# Patient Record
Sex: MALE | URBAN - NONMETROPOLITAN AREA
[De-identification: names, ages, dates, MRNs, and addresses within clinical notes are randomized per-mention and may not be internally consistent; named-entity substitution may affect disease eponyms.]

---

## 2018-11-20 ENCOUNTER — APPOINTMENT (OUTPATIENT)
Dept: GENERAL RADIOLOGY | Age: 38
End: 2018-11-20
Payer: MEDICARE

## 2018-11-20 ENCOUNTER — HOSPITAL ENCOUNTER (EMERGENCY)
Age: 38
Discharge: NURSING, REHAB OR LONG-TERM CARE | End: 2018-11-20
Attending: EMERGENCY MEDICINE
Payer: MEDICARE

## 2018-11-20 VITALS
RESPIRATION RATE: 17 BRPM | OXYGEN SATURATION: 94 % | DIASTOLIC BLOOD PRESSURE: 87 MMHG | HEART RATE: 86 BPM | TEMPERATURE: 97.8 F | SYSTOLIC BLOOD PRESSURE: 123 MMHG

## 2018-11-20 DIAGNOSIS — M86.171 OSTEOMYELITIS OF ANKLE OR FOOT, ACUTE, RIGHT (HCC): Primary | ICD-10-CM

## 2018-11-20 DIAGNOSIS — L97.912 ULCER OF RIGHT LOWER EXTREMITY WITH FAT LAYER EXPOSED (HCC): ICD-10-CM

## 2018-11-20 LAB
ANION GAP SERPL CALCULATED.3IONS-SCNC: 14 MEQ/L (ref 8–16)
BASOPHILS # BLD: 0.3 %
BASOPHILS ABSOLUTE: 0 THOU/MM3 (ref 0–0.1)
BUN BLDV-MCNC: 17 MG/DL (ref 7–22)
CALCIUM SERPL-MCNC: 9.3 MG/DL (ref 8.5–10.5)
CHLORIDE BLD-SCNC: 99 MEQ/L (ref 98–111)
CO2: 24 MEQ/L (ref 23–33)
CREAT SERPL-MCNC: 0.5 MG/DL (ref 0.4–1.2)
EOSINOPHIL # BLD: 2.6 %
EOSINOPHILS ABSOLUTE: 0.2 THOU/MM3 (ref 0–0.4)
ERYTHROCYTE [DISTWIDTH] IN BLOOD BY AUTOMATED COUNT: 17.4 % (ref 11.5–14.5)
ERYTHROCYTE [DISTWIDTH] IN BLOOD BY AUTOMATED COUNT: 54.1 FL (ref 35–45)
GFR SERPL CREATININE-BSD FRML MDRD: > 90 ML/MIN/1.73M2
GLUCOSE BLD-MCNC: 181 MG/DL (ref 70–108)
HCT VFR BLD CALC: 38.9 % (ref 42–52)
HEMOGLOBIN: 11.9 GM/DL (ref 14–18)
IMMATURE GRANS (ABS): 0.03 THOU/MM3 (ref 0–0.07)
IMMATURE GRANULOCYTES: 0.3 %
LYMPHOCYTES # BLD: 12.9 %
LYMPHOCYTES ABSOLUTE: 1.1 THOU/MM3 (ref 1–4.8)
MAGNESIUM: 1.9 MG/DL (ref 1.6–2.4)
MCH RBC QN AUTO: 26.2 PG (ref 26–33)
MCHC RBC AUTO-ENTMCNC: 30.6 GM/DL (ref 32.2–35.5)
MCV RBC AUTO: 85.7 FL (ref 80–94)
MONOCYTES # BLD: 6.6 %
MONOCYTES ABSOLUTE: 0.6 THOU/MM3 (ref 0.4–1.3)
NUCLEATED RED BLOOD CELLS: 0 /100 WBC
OSMOLALITY CALCULATION: 279.9 MOSMOL/KG (ref 275–300)
PLATELET # BLD: 288 THOU/MM3 (ref 130–400)
PMV BLD AUTO: 8.8 FL (ref 9.4–12.4)
POTASSIUM SERPL-SCNC: 4.6 MEQ/L (ref 3.5–5.2)
RBC # BLD: 4.54 MILL/MM3 (ref 4.7–6.1)
SEG NEUTROPHILS: 77.3 %
SEGMENTED NEUTROPHILS ABSOLUTE COUNT: 6.9 THOU/MM3 (ref 1.8–7.7)
SODIUM BLD-SCNC: 137 MEQ/L (ref 135–145)
WBC # BLD: 8.9 THOU/MM3 (ref 4.8–10.8)

## 2018-11-20 PROCEDURE — 36415 COLL VENOUS BLD VENIPUNCTURE: CPT

## 2018-11-20 PROCEDURE — 85025 COMPLETE CBC W/AUTO DIFF WBC: CPT

## 2018-11-20 PROCEDURE — 87186 SC STD MICRODIL/AGAR DIL: CPT

## 2018-11-20 PROCEDURE — 6370000000 HC RX 637 (ALT 250 FOR IP): Performed by: EMERGENCY MEDICINE

## 2018-11-20 PROCEDURE — 80048 BASIC METABOLIC PNL TOTAL CA: CPT

## 2018-11-20 PROCEDURE — 73590 X-RAY EXAM OF LOWER LEG: CPT

## 2018-11-20 PROCEDURE — 87205 SMEAR GRAM STAIN: CPT

## 2018-11-20 PROCEDURE — 87147 CULTURE TYPE IMMUNOLOGIC: CPT

## 2018-11-20 PROCEDURE — 87040 BLOOD CULTURE FOR BACTERIA: CPT

## 2018-11-20 PROCEDURE — 73600 X-RAY EXAM OF ANKLE: CPT

## 2018-11-20 PROCEDURE — 83735 ASSAY OF MAGNESIUM: CPT

## 2018-11-20 PROCEDURE — 99284 EMERGENCY DEPT VISIT MOD MDM: CPT

## 2018-11-20 PROCEDURE — 87070 CULTURE OTHR SPECIMN AEROBIC: CPT

## 2018-11-20 PROCEDURE — 87077 CULTURE AEROBIC IDENTIFY: CPT

## 2018-11-20 PROCEDURE — 2709999900 HC NON-CHARGEABLE SUPPLY

## 2018-11-20 RX ORDER — CLINDAMYCIN HYDROCHLORIDE 150 MG/1
300 CAPSULE ORAL ONCE
Status: COMPLETED | OUTPATIENT
Start: 2018-11-20 | End: 2018-11-20

## 2018-11-20 RX ORDER — GINSENG 100 MG
CAPSULE ORAL
Status: DISCONTINUED
Start: 2018-11-20 | End: 2018-11-20 | Stop reason: HOSPADM

## 2018-11-20 RX ADMIN — CLINDAMYCIN HYDROCHLORIDE 300 MG: 150 CAPSULE ORAL at 16:00

## 2018-11-20 ASSESSMENT — PAIN DESCRIPTION - LOCATION: LOCATION: BUTTOCKS

## 2018-11-20 ASSESSMENT — ENCOUNTER SYMPTOMS
EYE PAIN: 0
ABDOMINAL PAIN: 0
SINUS PRESSURE: 0
BLOOD IN STOOL: 0
EYE REDNESS: 0
PHOTOPHOBIA: 0
NAUSEA: 0
EYE ITCHING: 0
VOICE CHANGE: 0
TROUBLE SWALLOWING: 0
RHINORRHEA: 0
CHEST TIGHTNESS: 0
EYE DISCHARGE: 0
CONSTIPATION: 0
SHORTNESS OF BREATH: 0
COUGH: 0
BACK PAIN: 0
DIARRHEA: 0
WHEEZING: 0
SORE THROAT: 0
ABDOMINAL DISTENTION: 0
CHOKING: 0
VOMITING: 0

## 2018-11-20 ASSESSMENT — PAIN SCALES - GENERAL: PAINLEVEL_OUTOF10: 6

## 2018-11-20 ASSESSMENT — PAIN DESCRIPTION - PAIN TYPE: TYPE: ACUTE PAIN

## 2018-11-20 NOTE — ED PROVIDER NOTES
[facility]  eMERGENCY dEPARTMENT eNCOUnter          CHIEF COMPLAINT       Chief Complaint   Patient presents with    Wound Check       Nurses Notes reviewed and I agreeexcept as noted in the HPI. HISTORY OF PRESENT ILLNESS    Guero Chodwhury is a 45 y.o. male with a past medical history of DM who presents to the ED via EMS for evaluation of a wound check. Patient is currently a resident at [facility] secondary to a decubitus ulcer on the buttocks that requires frequent dressing and cleaning. Patient is awaiting treatment and surgery at [facility] for the ulcer. Patient states that he has developed an ulceration to the right lower leg that has gradually worsened over time. The wound is being cleaned and dressed every other day by nursing home staff. He has not been seen by Dr. Matheus Taylor (ID) at the wound clinic for the RLE ulcer. He was previously on antibiotics but these were stopped 5 days ago. No fevers or chills. Patient has a history of MRSA. No additional complaints or concerns at the time of initial evaluation. The HPI was provided by the patient. REVIEW OF SYSTEMS     Review of Systems   Constitutional: Negative for activity change, appetite change, diaphoresis, fatigue and unexpected weight change. HENT: Negative for congestion, ear discharge, ear pain, hearing loss, rhinorrhea, sinus pressure, sore throat, trouble swallowing and voice change. Eyes: Negative for photophobia, pain, discharge, redness and itching. Respiratory: Negative for cough, choking, chest tightness, shortness of breath and wheezing. Cardiovascular: Negative for chest pain, palpitations and leg swelling. Gastrointestinal: Negative for abdominal distention, abdominal pain, blood in stool, constipation, diarrhea, nausea and vomiting. Endocrine: Negative for polydipsia, polyphagia and polyuria.    Genitourinary: Negative for decreased urine volume, difficulty urinating, dysuria, enuresis, frequency, MAGNESIUM   ANION GAP   GLOMERULAR FILTRATION RATE, ESTIMATED   OSMOLALITY       EMERGENCY DEPARTMENT COURSE:   Vitals:    Vitals:    11/20/18 1410 11/20/18 1620   BP: 125/71 117/65   Pulse: 88 77   Resp: 17 15   Temp: 97.8 °F (36.6 °C)    TempSrc: Oral    SpO2: 94% 95%     2:27 PM: The patient was seen andevaluated. Appropriate labs were ordered and reviewed. Patient is afebrile. He is also nonambulatory. Patient is due to go to [place] upcoming this week for MRI of his lumbar spine, and wound flap repair. Patient has seen Dr. Kristin Park and Dr. Robel Liu in the past.  In review of his labs and imaging. He does not have white blood cell count. He is afebrile. He is normotensive. Vital signs are stable. X-ray of the wound of his lower extremity did not reveal any osteomyelitis. X-ray of his heel however did reveal osteomyelitis of the calcaneus. At this point I called and spoke with podiatry and Dr. Robel Liu discussed case with him. He would graciously see the patient in consult in his office this week coming up. He was instructed to call Dr. Robel Liu and schedule an appointment. I then called his infectious disease doctor, Dr. Kristin Park, and discussed case with him. The patient still has a PICC line placed. We will place the patient back on Zosyn for 14 days. And the patient will follow up with Dr. Kristin Park. I wrote a prescription for the antibiotics. I also wrote a prescription for wound care. Patient is scheduled for his MRI and follow-up with [place] doses proceed as planned. I discussed this at bedside with the patient who understood and agreed with the plan. Patient is supple and discharged home in stable condition        The patient has what appears to be a stage II pressure ulcer of his right lower extremity. He also has venous stasis ulcers of his heel of his foot. There is resulting osteomyelitis of this.   Caregivers are instructed to clean and dressed wound daily and have wound care address

## 2018-11-23 NOTE — PROGRESS NOTES
Pharmacy Note  ED Culture Follow-up    Bhavik Ramos is a 45 y.o. male. Allergies: Sulfa antibiotics     Labs:  Lab Results   Component Value Date    BUN 17 11/20/2018    CREATININE 0.5 11/20/2018    WBC 8.9 11/20/2018     CrCl cannot be calculated (Unknown ideal weight.). Current antimicrobials:   · Zosyn + clindamycin    ASSESSMENT:  Micro results:   Wound culture: positive for MRSA      PLAN:  Need for intervention: Yes, but will defer to provider at nursing home  Discussed with: Jordan Alvarado MD  Chosen treatment:    · Patient will be treated by provider at nursing home    Patient response:   · No need to contact patient    Called/sent in prescription to: Not applicable    Please call with any questions.  Prem Coates, PharmD 4:52 PM 11/23/2018

## 2018-11-24 LAB
AEROBIC CULTURE: ABNORMAL
GRAM STAIN RESULT: ABNORMAL
ORGANISM: ABNORMAL

## 2018-11-26 LAB
BLOOD CULTURE, ROUTINE: NORMAL
BLOOD CULTURE, ROUTINE: NORMAL

## 2018-12-29 DIAGNOSIS — G89.4 CHRONIC PAIN SYNDROME: Primary | ICD-10-CM

## 2018-12-29 PROBLEM — Z78.9 NURSING HOME RESIDENT: Status: ACTIVE | Noted: 2018-12-29

## 2018-12-29 RX ORDER — OXYCODONE HYDROCHLORIDE 5 MG/1
5 TABLET ORAL EVERY 6 HOURS PRN
Qty: 28 TABLET | Refills: 0 | Status: SHIPPED | OUTPATIENT
Start: 2018-12-29 | End: 2019-01-05

## 2019-03-10 DIAGNOSIS — F51.01 PRIMARY INSOMNIA: Primary | ICD-10-CM

## 2019-03-10 RX ORDER — ZOLPIDEM TARTRATE 5 MG/1
5 TABLET ORAL NIGHTLY PRN
Qty: 30 TABLET | Refills: 5 | Status: SHIPPED | OUTPATIENT
Start: 2019-03-10 | End: 2019-03-24

## 2019-03-13 ENCOUNTER — OUTSIDE SERVICES (OUTPATIENT)
Dept: FAMILY MEDICINE CLINIC | Age: 39
End: 2019-03-13

## 2019-03-13 DIAGNOSIS — F51.01 PRIMARY INSOMNIA: ICD-10-CM

## 2019-03-13 DIAGNOSIS — L89.104 PRESSURE INJURY OF BACK, STAGE 4 (HCC): ICD-10-CM

## 2019-03-13 DIAGNOSIS — Z79.4 TYPE 2 DIABETES MELLITUS WITH OTHER SPECIFIED COMPLICATION, WITH LONG-TERM CURRENT USE OF INSULIN (HCC): ICD-10-CM

## 2019-03-13 DIAGNOSIS — E66.01 MORBID OBESITY (HCC): ICD-10-CM

## 2019-03-13 DIAGNOSIS — E11.69 TYPE 2 DIABETES MELLITUS WITH OTHER SPECIFIED COMPLICATION, WITH LONG-TERM CURRENT USE OF INSULIN (HCC): ICD-10-CM

## 2019-03-13 DIAGNOSIS — I10 ESSENTIAL HYPERTENSION: ICD-10-CM

## 2019-03-19 ENCOUNTER — OUTSIDE SERVICES (OUTPATIENT)
Dept: FAMILY MEDICINE CLINIC | Age: 39
End: 2019-03-19
Payer: MEDICARE

## 2019-03-19 DIAGNOSIS — L89.154 DECUBITUS ULCER OF SACRAL REGION, STAGE 4 (HCC): ICD-10-CM

## 2019-03-19 DIAGNOSIS — L89.104 PRESSURE INJURY OF BACK, STAGE 4 (HCC): ICD-10-CM

## 2019-03-19 DIAGNOSIS — F51.01 PRIMARY INSOMNIA: ICD-10-CM

## 2019-03-19 DIAGNOSIS — Z79.4 TYPE 2 DIABETES MELLITUS WITH OTHER SPECIFIED COMPLICATION, WITH LONG-TERM CURRENT USE OF INSULIN (HCC): ICD-10-CM

## 2019-03-19 DIAGNOSIS — E66.01 MORBID OBESITY (HCC): Primary | ICD-10-CM

## 2019-03-19 DIAGNOSIS — E11.69 TYPE 2 DIABETES MELLITUS WITH OTHER SPECIFIED COMPLICATION, WITH LONG-TERM CURRENT USE OF INSULIN (HCC): ICD-10-CM

## 2019-03-19 DIAGNOSIS — I10 ESSENTIAL HYPERTENSION: ICD-10-CM

## 2019-03-19 PROCEDURE — G8484 FLU IMMUNIZE NO ADMIN: HCPCS | Performed by: FAMILY MEDICINE

## 2019-03-19 PROCEDURE — 3046F HEMOGLOBIN A1C LEVEL >9.0%: CPT | Performed by: FAMILY MEDICINE

## 2019-03-19 PROCEDURE — 99308 SBSQ NF CARE LOW MDM 20: CPT | Performed by: FAMILY MEDICINE

## 2019-04-08 PROBLEM — L89.154 DECUBITUS ULCER OF SACRAL REGION, STAGE 4 (HCC): Chronic | Status: ACTIVE | Noted: 2018-11-08

## 2019-04-08 PROBLEM — E66.01 MORBID OBESITY DUE TO EXCESS CALORIES (HCC): Chronic | Status: ACTIVE | Noted: 2018-11-08

## 2019-04-16 NOTE — PROGRESS NOTES
Psychiatric: He has a normal mood and affect. His behavior is normal. Judgment and thought content normal.   Nursing note and vitals reviewed. ASSESSMENT & PLAN  Kayleigh Bueno was seen today for 1 month follow-up. Diagnoses and all orders for this visit:    Other specified diabetes mellitus with other specified complication, without long-term current use of insulin ([address]    Decubitus ulcer of sacral region, stage 4 ([address]    Morbid obesity due to excess calories ([address]    Primary insomnia    Essential hypertension          1. Decubitus Ulcer:  cont wound care, Oxycodone  2. DM2:  cont Levemir, Metformin, SS insulin, Linagliptin  3. HTN:  cont Metoprolol,   4.  Insomnia:  cont Gerri Flatten

## 2019-04-19 ENCOUNTER — OUTSIDE SERVICES (OUTPATIENT)
Dept: FAMILY MEDICINE CLINIC | Age: 39
End: 2019-04-19
Payer: MEDICARE

## 2019-04-19 DIAGNOSIS — E66.01 MORBID OBESITY DUE TO EXCESS CALORIES (HCC): Chronic | ICD-10-CM

## 2019-04-19 DIAGNOSIS — I10 ESSENTIAL HYPERTENSION: Chronic | ICD-10-CM

## 2019-04-19 DIAGNOSIS — E13.69 OTHER SPECIFIED DIABETES MELLITUS WITH OTHER SPECIFIED COMPLICATION, WITHOUT LONG-TERM CURRENT USE OF INSULIN (HCC): Primary | Chronic | ICD-10-CM

## 2019-04-19 DIAGNOSIS — L89.154 DECUBITUS ULCER OF SACRAL REGION, STAGE 4 (HCC): Chronic | ICD-10-CM

## 2019-04-19 DIAGNOSIS — F51.01 PRIMARY INSOMNIA: Chronic | ICD-10-CM

## 2019-04-19 PROCEDURE — 99308 SBSQ NF CARE LOW MDM 20: CPT | Performed by: FAMILY MEDICINE

## 2019-04-19 PROCEDURE — 3046F HEMOGLOBIN A1C LEVEL >9.0%: CPT | Performed by: FAMILY MEDICINE

## 2019-05-29 ENCOUNTER — OUTSIDE SERVICES (OUTPATIENT)
Dept: FAMILY MEDICINE CLINIC | Age: 39
End: 2019-05-29
Payer: MEDICARE

## 2019-05-29 DIAGNOSIS — G89.29 OTHER CHRONIC PAIN: ICD-10-CM

## 2019-05-29 DIAGNOSIS — E13.69 OTHER SPECIFIED DIABETES MELLITUS WITH OTHER SPECIFIED COMPLICATION, WITHOUT LONG-TERM CURRENT USE OF INSULIN (HCC): Chronic | ICD-10-CM

## 2019-05-29 DIAGNOSIS — L89.154 DECUBITUS ULCER OF SACRAL REGION, STAGE 4 (HCC): Chronic | ICD-10-CM

## 2019-05-29 DIAGNOSIS — F51.01 PRIMARY INSOMNIA: Chronic | ICD-10-CM

## 2019-05-29 DIAGNOSIS — I10 ESSENTIAL HYPERTENSION: Primary | Chronic | ICD-10-CM

## 2019-05-29 DIAGNOSIS — E66.01 MORBID OBESITY DUE TO EXCESS CALORIES (HCC): Chronic | ICD-10-CM

## 2019-05-29 PROCEDURE — 99309 SBSQ NF CARE MODERATE MDM 30: CPT | Performed by: NURSE PRACTITIONER

## 2019-05-29 NOTE — PROGRESS NOTES
Polydipsia, Polyphagia, Polyuria    OBJECTIVE:  PHYSICAL EXAM:  VS:  141/79, 97.9, 96, 19, 242 bs, 94%  Weight in pounds:  414.4  Pain: Denies    VS reviewed. General Appearance: morbidly obese, debilitated, in no acute distress  Head: normocephalic and atraumatic  Eyes: conjunctivae and eye lids without erythema  ENT: external ear and ear canal normal bilaterally, nose without deformity, nasal mucosa and turbinates normal without polyps, oropharynx normal, dentition is normal for age, no lip or gum lesions noted  Neck: supple and non-tender without mass, no thyromegaly or thyroid nodules, no cervical lymphadenopathy  Pulmonary/Chest: clear but diminished to auscultation bilaterally- no wheezes, rales or rhonchi, normal air movement, no respiratory distress or retractions, requires no supplemental oxygen  Cardiovascular: normal rate, regular rhythm, normal S1 and S2, no murmurs, rubs, clicks, or gallops, distal pulses intact  Abdomen: soft, non-tender, non-distended, bowel sounds physiologic,  no rebound or guarding, no masses or hernias noted. Liver and spleen without enlargement. Extremities: no cyanosis, clubbing or edema of the lower extremities  Musculoskeletal: No joint swelling or gross deformity   Neuro:  Alert, 4/5 strength globally and symmetrically, normal speech, no focal findings or movement disorder noted  Psych:  Normal affect without evidence of depression or anxiety, insight and judgement are intact, memory appears intact. He is routinely pleasant and communicates well with staff. Staff take good care of him. Skin: warm and dry, no rash or erythema    ASSESSMENT & PLAN:    1. Essential hypertension  BP is routinely a bit high with SBP typically in the 140s to 150s and HR typically in the 80s and 90s. Patient denies headaches, chest pain. Increase dose of metoprolol tartrate to 25 mg po every 12 hours. Continue to monitor.     2. Other specified diabetes mellitus with other specified complication, without long-term current use of insulin (HCC)  Blood glucose levels are routinely in the 200s to 300s. Metformin was increased last month, with no improvement. Continue current dose of Detemir, Metformin, Linagliptin, sliding scale Humalog. Adding Humalog 4 units sq TID with meals to medication regimen. Continue to encourage carb controlled diet as tolerates. 3. Decubitus ulcer of sacral region, stage 4 (HCC)  Current with Dr. Nayana Pastor, ID. Appreciate Wound Nurse's input. Asking for a CBC and BMP on Thursday. 4. Morbid obesity due to excess calories ([address]  Continues to gain weight. Appetite good. Continue to encourage diabetic diet. Family brings patient food from home. He is working with Therapy and SW to obtain a new motorized WC for increased mobility. He reports his current motorized wheelchair does not fit him properly. 5. Other chronic pain  Patient has weaned down from large doses of narcotics fairly well since admission. Continue prn Oxycodone. Will decrease Tylenol from 1 gram po 4 times daily scheduled to 650 mg po 4 times daily scheduled. Patient states his pain is improving as his wound heals. 6. Primary insomnia  Continue home dose of Ambien. Denies uncontrolled insomnia. 7.  Bilateral lower leg edema  Weight is up a few pounds. Will initiate Lasix 20 mg po once daily x 3 days. Monitor electrolytes. Disposition:  Assessment and plan as stated above. Follow-up per routine and as warranted. Plan of care reviewed by Dr. Mattie Talley DO.   Electronically signed by GAYATHRI Palacios NP on 5/29/2019 at 12:13 PM

## 2019-06-27 NOTE — PROGRESS NOTES
Psychiatric: He has a normal mood and affect. His behavior is normal. Judgment and thought content normal.   Nursing note and vitals reviewed. ASSESSMENT & PLAN  Kailash Salvador was seen today for wound infection. Diagnoses and all orders for this visit:    Decubitus ulcer of sacral region, stage 4 ([address]    Morbid obesity due to excess calories ([address]    Primary insomnia    Essential hypertension    Other specified diabetes mellitus with other specified complication, without long-term current use of insulin ([address]          1. Decubitus Ulcer:  cont wound care, Oxycodone  2. DM2:  cont Levemir, Metformin, SS insulin, Linagliptin  3. HTN:  cont Metoprolol,   4.  Insomnia:  cont Nimo Lora

## 2019-06-28 ENCOUNTER — OUTSIDE SERVICES (OUTPATIENT)
Dept: FAMILY MEDICINE CLINIC | Age: 39
End: 2019-06-28
Payer: MEDICARE

## 2019-06-28 DIAGNOSIS — F51.01 PRIMARY INSOMNIA: Chronic | ICD-10-CM

## 2019-06-28 DIAGNOSIS — I10 ESSENTIAL HYPERTENSION: Chronic | ICD-10-CM

## 2019-06-28 DIAGNOSIS — E13.69 OTHER SPECIFIED DIABETES MELLITUS WITH OTHER SPECIFIED COMPLICATION, WITHOUT LONG-TERM CURRENT USE OF INSULIN (HCC): Chronic | ICD-10-CM

## 2019-06-28 DIAGNOSIS — L89.154 DECUBITUS ULCER OF SACRAL REGION, STAGE 4 (HCC): Primary | Chronic | ICD-10-CM

## 2019-06-28 DIAGNOSIS — E66.01 MORBID OBESITY DUE TO EXCESS CALORIES (HCC): Chronic | ICD-10-CM

## 2019-06-28 PROCEDURE — 3046F HEMOGLOBIN A1C LEVEL >9.0%: CPT | Performed by: FAMILY MEDICINE

## 2019-06-28 PROCEDURE — 99308 SBSQ NF CARE LOW MDM 20: CPT | Performed by: FAMILY MEDICINE

## 2019-07-17 ENCOUNTER — OUTSIDE SERVICES (OUTPATIENT)
Dept: FAMILY MEDICINE CLINIC | Age: 39
End: 2019-07-17
Payer: MEDICARE

## 2019-07-17 DIAGNOSIS — R05.9 COUGH: ICD-10-CM

## 2019-07-17 DIAGNOSIS — J02.9 SORE THROAT: ICD-10-CM

## 2019-07-17 DIAGNOSIS — R09.89 CHEST CONGESTION: Primary | ICD-10-CM

## 2019-07-17 PROCEDURE — 99309 SBSQ NF CARE MODERATE MDM 30: CPT | Performed by: NURSE PRACTITIONER

## 2019-07-25 ENCOUNTER — OUTSIDE SERVICES (OUTPATIENT)
Dept: FAMILY MEDICINE CLINIC | Age: 39
End: 2019-07-25
Payer: MEDICARE

## 2019-07-25 DIAGNOSIS — M62.81 GENERALIZED MUSCLE WEAKNESS: ICD-10-CM

## 2019-07-25 DIAGNOSIS — E66.01 MORBID OBESITY DUE TO EXCESS CALORIES (HCC): Chronic | ICD-10-CM

## 2019-07-25 DIAGNOSIS — I10 ESSENTIAL HYPERTENSION: Chronic | ICD-10-CM

## 2019-07-25 DIAGNOSIS — L89.154 DECUBITUS ULCER OF SACRAL REGION, STAGE 4 (HCC): Primary | Chronic | ICD-10-CM

## 2019-07-25 DIAGNOSIS — Z93.3 COLOSTOMY STATUS (HCC): ICD-10-CM

## 2019-07-25 DIAGNOSIS — G89.29 OTHER CHRONIC PAIN: ICD-10-CM

## 2019-07-25 DIAGNOSIS — E13.69 OTHER SPECIFIED DIABETES MELLITUS WITH OTHER SPECIFIED COMPLICATION, WITHOUT LONG-TERM CURRENT USE OF INSULIN (HCC): Chronic | ICD-10-CM

## 2019-07-25 PROCEDURE — 99309 SBSQ NF CARE MODERATE MDM 30: CPT | Performed by: NURSE PRACTITIONER

## 2019-07-25 NOTE — PROGRESS NOTES
and HR typically in the 80s and 90s. Patient denies headaches, chest pain. Increased dose of metoprolol tartrate to 25 mg po every 12 hours last visit. Will increase metoprolol to 50 mg po every 12 hours for better BP and HR control. Continue to monitor. 2. Other specified diabetes mellitus with other specified complication, without long-term current use of insulin (HCC)  Blood glucose levels are routinely in the 200s to 300s. Continue current dose of Metformin, Linagliptin, sliding scale Humalog, scheduled Humalog TID with meals. Will increase dosage of Detemir to 28 units sq daily at HS. Continue to encourage carb controlled diet as tolerates. 3. Decubitus ulcer of sacral region, stage 4 (HCC)  Current with Dr. Blaire Stovall, ID. Appreciate Wound Nurse's input. Asking for a CBC and BMP on Thursday. 4. Morbid obesity due to excess calories ([address]  Continues to gain weight. Appetite good. Continue to encourage diabetic diet. Family brings patient food from home. He is working with Therapy and  to obtain a new motorized WC for increased mobility. He reports his current motorized wheelchair does not fit him properly. 5. Other chronic pain  Patient has weaned down from large doses of narcotics fairly well since admission. Continue prn Oxycodone and scheduled Tylenol. He has tolerated weaning down of Oxycodone well, but I feel he still benefits from this for dressing changes. Patient states his pain is improving as his wound heals. 6. Primary insomnia  Continue home dose of Ambien. Denies uncontrolled insomnia. 7.  Bilateral lower leg edema  Weight is up a few pounds. Will initiate Lasix 20 mg po once for now. BMP on Monday. Disposition:  Assessment and plan as stated above. Follow-up per routine and as warranted. Plan of care reviewed by Dr. David Rubio DO.   Electronically signed by GAYATHRI Dickerson NP on 7/25/2019 at 6:31 PM

## 2019-08-14 ENCOUNTER — OUTSIDE SERVICES (OUTPATIENT)
Dept: FAMILY MEDICINE CLINIC | Age: 39
End: 2019-08-14
Payer: MEDICARE

## 2019-08-14 DIAGNOSIS — E13.69 OTHER SPECIFIED DIABETES MELLITUS WITH OTHER SPECIFIED COMPLICATION, WITHOUT LONG-TERM CURRENT USE OF INSULIN (HCC): Primary | Chronic | ICD-10-CM

## 2019-08-14 DIAGNOSIS — F33.1 MAJOR DEPRESSIVE DISORDER, RECURRENT EPISODE, MODERATE (HCC): ICD-10-CM

## 2019-08-14 PROCEDURE — 99309 SBSQ NF CARE MODERATE MDM 30: CPT | Performed by: NURSE PRACTITIONER

## 2019-08-26 ENCOUNTER — OUTSIDE SERVICES (OUTPATIENT)
Dept: FAMILY MEDICINE CLINIC | Age: 39
End: 2019-08-26
Payer: MEDICARE

## 2019-08-26 DIAGNOSIS — Z93.3 COLOSTOMY STATUS (HCC): ICD-10-CM

## 2019-08-26 DIAGNOSIS — I10 ESSENTIAL HYPERTENSION: Chronic | ICD-10-CM

## 2019-08-26 DIAGNOSIS — F33.1 MAJOR DEPRESSIVE DISORDER, RECURRENT EPISODE, MODERATE (HCC): ICD-10-CM

## 2019-08-26 DIAGNOSIS — L89.154 STAGE IV PRESSURE ULCER OF SACRAL REGION (HCC): Primary | Chronic | ICD-10-CM

## 2019-08-26 DIAGNOSIS — E13.69 OTHER SPECIFIED DIABETES MELLITUS WITH OTHER SPECIFIED COMPLICATION, WITHOUT LONG-TERM CURRENT USE OF INSULIN (HCC): Chronic | ICD-10-CM

## 2019-08-26 DIAGNOSIS — G89.29 OTHER CHRONIC PAIN: ICD-10-CM

## 2019-08-26 DIAGNOSIS — E66.01 MORBID OBESITY (HCC): ICD-10-CM

## 2019-08-26 PROCEDURE — 99309 SBSQ NF CARE MODERATE MDM 30: CPT | Performed by: NURSE PRACTITIONER

## 2019-09-10 ENCOUNTER — OUTSIDE SERVICES (OUTPATIENT)
Dept: PHYSICAL MEDICINE AND REHAB | Age: 39
End: 2019-09-10
Payer: MEDICARE

## 2019-09-10 DIAGNOSIS — R26.9 NEUROLOGIC GAIT DYSFUNCTION: ICD-10-CM

## 2019-09-10 PROCEDURE — 99305 1ST NF CARE MODERATE MDM 35: CPT | Performed by: PHYSICAL MEDICINE & REHABILITATION

## 2019-09-25 ENCOUNTER — OUTSIDE SERVICES (OUTPATIENT)
Dept: FAMILY MEDICINE CLINIC | Age: 39
End: 2019-09-25
Payer: MEDICARE

## 2019-09-25 DIAGNOSIS — Z93.3 COLOSTOMY STATUS (HCC): ICD-10-CM

## 2019-09-25 DIAGNOSIS — I10 ESSENTIAL HYPERTENSION: Chronic | ICD-10-CM

## 2019-09-25 DIAGNOSIS — E13.69 OTHER SPECIFIED DIABETES MELLITUS WITH OTHER SPECIFIED COMPLICATION, WITHOUT LONG-TERM CURRENT USE OF INSULIN (HCC): Chronic | ICD-10-CM

## 2019-09-25 DIAGNOSIS — G89.29 OTHER CHRONIC PAIN: ICD-10-CM

## 2019-09-25 DIAGNOSIS — E66.01 MORBID OBESITY DUE TO EXCESS CALORIES (HCC): Chronic | ICD-10-CM

## 2019-09-25 DIAGNOSIS — M62.81 GENERALIZED MUSCLE WEAKNESS: ICD-10-CM

## 2019-09-25 DIAGNOSIS — L89.154 STAGE IV PRESSURE ULCER OF SACRAL REGION (HCC): Chronic | ICD-10-CM

## 2019-09-25 DIAGNOSIS — F33.1 MAJOR DEPRESSIVE DISORDER, RECURRENT EPISODE, MODERATE (HCC): Primary | ICD-10-CM

## 2019-09-25 DIAGNOSIS — F51.01 PRIMARY INSOMNIA: Chronic | ICD-10-CM

## 2019-09-25 PROCEDURE — 99309 SBSQ NF CARE MODERATE MDM 30: CPT | Performed by: NURSE PRACTITIONER

## 2019-09-27 NOTE — PROGRESS NOTES
[facility] Progress Note    NAME: Reshma Landry  DATE: 19  ROOM #: A 08-2  : 1980  REASON FOR VISIT: Other (routine visit)    CODE STATUS: Full Code    History obtained from chart review, the patient and staff. SUBJECTIVE:  HPI: Cris Kenney is a 44 y.o. male. Patient has a PMH significant for:  Past Medical History:   Diagnosis Date    Diabetes ([address]     HTN (hypertension)     Insomnia     Morbid obesity ([address]     Pressure ulcer        Pt seen and examined at bedside today and is noted to be in no acute distress. Staff provide appropriate updates; appreciate staff input. I have reviewed patients past medical, surgical, social, and family history and have made updates where appropriate. Allergies and Medications were reviewed through the [facility] EMR.     Patient Active Problem List    Diagnosis Date Noted    Major depressive disorder, recurrent episode, moderate ([address] 2019    Colostomy status ([address] 2019    Generalized muscle weakness 2019    Other chronic pain 2019    Morbid obesity ([address]     Diabetes ([address]     HTN (hypertension)     Insomnia     Nursing home resident 2018    Morbid obesity due to excess calories ([address] 2018    Stage IV pressure ulcer of sacral region ([address] 2018       REVIEW OF SYSTEMS  Positive responses are highlighted in bold  Constitutional:  Fever, Chills, Night Sweats, Fatigue, Unexpected changes in weight  HENT:  Ear pain, Tinnitus, Nosebleeds, Trouble swallowing, Hearing loss, Sore throat  Cardiovascular:  Chest Pain, Palpitations, Orthopnea, Paroxysmal Nocturnal Dyspnea  Respiratory:  Cough, Wheezing, Shortness of breath, Chest tightness, Apnea  Gastrointestinal:  Nausea, Vomiting, Diarrhea, Constipation, Heartburn, Blood in stool  Genitourinary:  Difficulty or painful urination, Flank pain, Change in frequency, Urgency  Skin:  Color change, Rash, Itching, Wound  Musculoskeletal:  Joint pain, Back pain, Gait

## 2019-10-09 ENCOUNTER — OUTSIDE SERVICES (OUTPATIENT)
Dept: FAMILY MEDICINE CLINIC | Age: 39
End: 2019-10-09
Payer: MEDICARE

## 2019-10-09 DIAGNOSIS — E13.69 OTHER SPECIFIED DIABETES MELLITUS WITH OTHER SPECIFIED COMPLICATION, WITHOUT LONG-TERM CURRENT USE OF INSULIN (HCC): Chronic | ICD-10-CM

## 2019-10-09 DIAGNOSIS — R60.0 BILATERAL LEG EDEMA: ICD-10-CM

## 2019-10-09 DIAGNOSIS — L03.115 CELLULITIS OF RIGHT LEG: Primary | ICD-10-CM

## 2019-10-09 PROCEDURE — 99309 SBSQ NF CARE MODERATE MDM 30: CPT | Performed by: NURSE PRACTITIONER

## 2019-10-15 ENCOUNTER — OUTSIDE SERVICES (OUTPATIENT)
Dept: FAMILY MEDICINE CLINIC | Age: 39
End: 2019-10-15
Payer: MEDICARE

## 2019-10-15 DIAGNOSIS — R60.0 BILATERAL LEG EDEMA: ICD-10-CM

## 2019-10-15 DIAGNOSIS — L03.116 LEFT LEG CELLULITIS: Primary | ICD-10-CM

## 2019-10-15 PROCEDURE — 99309 SBSQ NF CARE MODERATE MDM 30: CPT | Performed by: NURSE PRACTITIONER

## 2019-10-16 ENCOUNTER — OUTSIDE SERVICES (OUTPATIENT)
Dept: FAMILY MEDICINE CLINIC | Age: 39
End: 2019-10-16
Payer: MEDICARE

## 2019-10-16 DIAGNOSIS — E13.69 OTHER SPECIFIED DIABETES MELLITUS WITH OTHER SPECIFIED COMPLICATION, WITHOUT LONG-TERM CURRENT USE OF INSULIN (HCC): Primary | Chronic | ICD-10-CM

## 2019-10-16 DIAGNOSIS — E66.01 MORBID OBESITY DUE TO EXCESS CALORIES (HCC): Chronic | ICD-10-CM

## 2019-10-16 DIAGNOSIS — R60.0 BILATERAL LEG EDEMA: ICD-10-CM

## 2019-10-16 PROCEDURE — 99497 ADVNCD CARE PLAN 30 MIN: CPT | Performed by: NURSE PRACTITIONER

## 2019-10-23 ENCOUNTER — OUTSIDE SERVICES (OUTPATIENT)
Dept: FAMILY MEDICINE CLINIC | Age: 39
End: 2019-10-23
Payer: MEDICARE

## 2019-10-23 DIAGNOSIS — I10 ESSENTIAL HYPERTENSION: Chronic | ICD-10-CM

## 2019-10-23 DIAGNOSIS — L89.154 STAGE IV PRESSURE ULCER OF SACRAL REGION (HCC): Primary | Chronic | ICD-10-CM

## 2019-10-23 DIAGNOSIS — E66.01 MORBID OBESITY DUE TO EXCESS CALORIES (HCC): Chronic | ICD-10-CM

## 2019-10-23 DIAGNOSIS — E13.69 OTHER SPECIFIED DIABETES MELLITUS WITH OTHER SPECIFIED COMPLICATION, WITHOUT LONG-TERM CURRENT USE OF INSULIN (HCC): Chronic | ICD-10-CM

## 2019-10-23 DIAGNOSIS — F51.01 PRIMARY INSOMNIA: Chronic | ICD-10-CM

## 2019-10-23 PROCEDURE — 99308 SBSQ NF CARE LOW MDM 20: CPT | Performed by: FAMILY MEDICINE

## 2019-10-28 ENCOUNTER — OUTSIDE SERVICES (OUTPATIENT)
Dept: FAMILY MEDICINE CLINIC | Age: 39
End: 2019-10-28
Payer: MEDICARE

## 2019-10-28 DIAGNOSIS — R60.0 BILATERAL LEG EDEMA: Primary | ICD-10-CM

## 2019-10-28 DIAGNOSIS — E13.69 OTHER SPECIFIED DIABETES MELLITUS WITH OTHER SPECIFIED COMPLICATION, WITHOUT LONG-TERM CURRENT USE OF INSULIN (HCC): Chronic | ICD-10-CM

## 2019-10-28 PROCEDURE — 99309 SBSQ NF CARE MODERATE MDM 30: CPT | Performed by: NURSE PRACTITIONER

## 2019-11-11 ENCOUNTER — OUTSIDE SERVICES (OUTPATIENT)
Dept: FAMILY MEDICINE CLINIC | Age: 39
End: 2019-11-11
Payer: MEDICARE

## 2019-11-11 DIAGNOSIS — N50.89 SCROTAL EDEMA: ICD-10-CM

## 2019-11-11 PROCEDURE — 99309 SBSQ NF CARE MODERATE MDM 30: CPT | Performed by: NURSE PRACTITIONER

## 2019-11-12 ENCOUNTER — OUTSIDE SERVICES (OUTPATIENT)
Dept: FAMILY MEDICINE CLINIC | Age: 39
End: 2019-11-12
Payer: MEDICARE

## 2019-11-12 DIAGNOSIS — N50.89 SCROTAL EDEMA: ICD-10-CM

## 2019-11-12 PROCEDURE — 99309 SBSQ NF CARE MODERATE MDM 30: CPT | Performed by: NURSE PRACTITIONER

## 2019-11-13 ENCOUNTER — OUTSIDE SERVICES (OUTPATIENT)
Dept: FAMILY MEDICINE CLINIC | Age: 39
End: 2019-11-13
Payer: MEDICARE

## 2019-11-13 DIAGNOSIS — N50.89 SCROTAL EDEMA: Primary | ICD-10-CM

## 2019-11-13 PROCEDURE — 99308 SBSQ NF CARE LOW MDM 20: CPT | Performed by: NURSE PRACTITIONER

## 2019-11-22 ENCOUNTER — OUTSIDE SERVICES (OUTPATIENT)
Dept: FAMILY MEDICINE CLINIC | Age: 39
End: 2019-11-22
Payer: MEDICARE

## 2019-11-22 DIAGNOSIS — R60.0 BILATERAL LEG EDEMA: Primary | ICD-10-CM

## 2019-11-22 DIAGNOSIS — N50.89 SCROTAL EDEMA: ICD-10-CM

## 2019-11-22 PROCEDURE — 99309 SBSQ NF CARE MODERATE MDM 30: CPT | Performed by: NURSE PRACTITIONER

## 2019-11-27 ENCOUNTER — OUTSIDE SERVICES (OUTPATIENT)
Dept: FAMILY MEDICINE CLINIC | Age: 39
End: 2019-11-27
Payer: MEDICARE

## 2019-11-27 DIAGNOSIS — Z93.3 COLOSTOMY STATUS (HCC): ICD-10-CM

## 2019-11-27 DIAGNOSIS — E13.69 OTHER SPECIFIED DIABETES MELLITUS WITH OTHER SPECIFIED COMPLICATION, WITHOUT LONG-TERM CURRENT USE OF INSULIN (HCC): Chronic | ICD-10-CM

## 2019-11-27 DIAGNOSIS — G89.29 OTHER CHRONIC PAIN: ICD-10-CM

## 2019-11-27 DIAGNOSIS — E66.01 MORBID OBESITY DUE TO EXCESS CALORIES (HCC): Chronic | ICD-10-CM

## 2019-11-27 DIAGNOSIS — I10 ESSENTIAL HYPERTENSION: Primary | Chronic | ICD-10-CM

## 2019-11-27 DIAGNOSIS — M62.81 GENERALIZED MUSCLE WEAKNESS: ICD-10-CM

## 2019-11-27 DIAGNOSIS — N50.89 SCROTAL EDEMA: ICD-10-CM

## 2019-11-27 DIAGNOSIS — R60.0 BILATERAL LEG EDEMA: ICD-10-CM

## 2019-11-27 DIAGNOSIS — F33.1 MAJOR DEPRESSIVE DISORDER, RECURRENT EPISODE, MODERATE (HCC): ICD-10-CM

## 2019-11-27 DIAGNOSIS — L89.154 STAGE IV PRESSURE ULCER OF SACRAL REGION (HCC): Chronic | ICD-10-CM

## 2019-11-27 PROCEDURE — 99309 SBSQ NF CARE MODERATE MDM 30: CPT | Performed by: NURSE PRACTITIONER

## 2019-12-13 ENCOUNTER — OUTSIDE SERVICES (OUTPATIENT)
Dept: FAMILY MEDICINE CLINIC | Age: 39
End: 2019-12-13
Payer: MEDICARE

## 2019-12-13 DIAGNOSIS — M62.81 GENERALIZED MUSCLE WEAKNESS: ICD-10-CM

## 2019-12-13 DIAGNOSIS — I10 ESSENTIAL HYPERTENSION: Chronic | ICD-10-CM

## 2019-12-13 DIAGNOSIS — F33.1 MAJOR DEPRESSIVE DISORDER, RECURRENT EPISODE, MODERATE (HCC): ICD-10-CM

## 2019-12-13 DIAGNOSIS — E66.01 MORBID OBESITY (HCC): Primary | ICD-10-CM

## 2019-12-13 DIAGNOSIS — G89.29 OTHER CHRONIC PAIN: ICD-10-CM

## 2019-12-13 DIAGNOSIS — Z93.3 COLOSTOMY STATUS (HCC): ICD-10-CM

## 2019-12-13 DIAGNOSIS — F51.01 PRIMARY INSOMNIA: Chronic | ICD-10-CM

## 2019-12-13 DIAGNOSIS — L89.154 STAGE IV PRESSURE ULCER OF SACRAL REGION (HCC): Chronic | ICD-10-CM

## 2019-12-13 DIAGNOSIS — E13.69 OTHER SPECIFIED DIABETES MELLITUS WITH OTHER SPECIFIED COMPLICATION, WITHOUT LONG-TERM CURRENT USE OF INSULIN (HCC): Chronic | ICD-10-CM

## 2019-12-13 DIAGNOSIS — R60.0 BILATERAL LEG EDEMA: ICD-10-CM

## 2019-12-13 DIAGNOSIS — N50.89 SCROTAL EDEMA: ICD-10-CM

## 2019-12-13 PROCEDURE — 99309 SBSQ NF CARE MODERATE MDM 30: CPT | Performed by: NURSE PRACTITIONER

## 2019-12-24 ENCOUNTER — OUTSIDE SERVICES (OUTPATIENT)
Dept: FAMILY MEDICINE CLINIC | Age: 39
End: 2019-12-24
Payer: MEDICARE

## 2019-12-24 DIAGNOSIS — N50.89 SCROTAL EDEMA: Primary | ICD-10-CM

## 2019-12-24 PROCEDURE — 99309 SBSQ NF CARE MODERATE MDM 30: CPT | Performed by: NURSE PRACTITIONER

## 2020-01-03 ENCOUNTER — OUTSIDE SERVICES (OUTPATIENT)
Dept: FAMILY MEDICINE CLINIC | Age: 40
End: 2020-01-03
Payer: MEDICARE

## 2020-01-03 PROCEDURE — 99308 SBSQ NF CARE LOW MDM 20: CPT | Performed by: NURSE PRACTITIONER

## 2020-01-13 ENCOUNTER — OUTSIDE SERVICES (OUTPATIENT)
Dept: FAMILY MEDICINE CLINIC | Age: 40
End: 2020-01-13
Payer: MEDICARE

## 2020-01-13 PROBLEM — E11.9 DIABETES MELLITUS (HCC): Status: ACTIVE | Noted: 2020-01-13

## 2020-01-13 PROCEDURE — 99309 SBSQ NF CARE MODERATE MDM 30: CPT | Performed by: NURSE PRACTITIONER

## 2020-01-13 NOTE — PROGRESS NOTES
[facility] & Progress Note    NAME: Katheryn Landry  DATE: 1/3/20  ROOM #: A 08-2  : 1980  REASON FOR VISIT: Other (Acute visit regarding cellulitis and weight gain)    CODE STATUS: Full Code    History obtained from chart review, the patient and staff. SUBJECTIVE:  HPI: Sergey Vivas is a 44 y.o. male. Patient has a PMH significant for:  Past Medical History:   Diagnosis Date    Diabetes ([address]     HTN (hypertension)     Insomnia     Morbid obesity ([address]     Pressure ulcer        Pt seen and examined at bedside today and is noted to be in no acute distress. Staff provide appropriate updates; appreciate staff input. I have reviewed patients past medical, surgical, social, and family history and have made updates where appropriate. Allergies and Medications were reviewed through the [facility] EMR.     Patient Active Problem List    Diagnosis Date Noted    Diabetes mellitus ([address] 2020    Scrotal edema 2019    Bilateral leg edema 10/09/2019    Major depressive disorder, recurrent episode, moderate ([address] 2019    Colostomy status ([address] 2019    Generalized muscle weakness 2019    Other chronic pain 2019    Morbid obesity ([address]     Diabetes ([address]     HTN (hypertension)     Insomnia     Nursing home resident 2018    Morbid obesity due to excess calories ([address] 2018    Stage IV pressure ulcer of sacral region ([address] 2018       REVIEW OF SYSTEMS  Positive responses are highlighted in bold  Constitutional:  Fever, Chills, Night Sweats, Fatigue, Unexpected changes in weight  HENT:  Ear pain, Tinnitus, Nosebleeds, Trouble swallowing, Hearing loss, Sore throat  Cardiovascular:  Chest Pain, Palpitations, Orthopnea, Paroxysmal Nocturnal Dyspnea  Respiratory:  Cough, Occasional Wheezing, Shortness of breath, Chest tightness, Apnea  Gastrointestinal:  Nausea, Vomiting, Diarrhea, Constipation, Heartburn, Blood in stool  Genitourinary:  Difficulty or painful urination, Flank pain, Change in frequency, Urgency; scrotal edema  Skin:  Color change, Rash, Itching, Wound  Musculoskeletal:  Joint pain, Back pain, Gait problems, Joint swelling, Myalgias  Neurological:  Dizziness, Headaches, Presyncope, Numbness, Seizures, Tremors  Endocrine:  Heat Intolerance, Cold Intolerance, Polydipsia, Polyphagia, Polyuria    OBJECTIVE:  PHYSICAL EXAM:  VS:  156/82, 97.7, 100, 18, 180 blood glucose  Weight in pounds:  457.9 (was 457.0, 445.8, 441.7, 411.3, 416.2, 421.6, 414.4)  Pain: Denies    VS reviewed. General Appearance: morbidly obese, debilitated, in no acute distress  Head: normocephalic and atraumatic  Eyes: conjunctivae and eye lids without erythema  ENT: external ear and ear canal normal bilaterally, nose without deformity, nasal mucosa and turbinates normal without polyps, oropharynx normal, dentition is normal for age, no lip or gum lesions noted  Neck: supple and non-tender without mass, no thyromegaly or thyroid nodules, no cervical lymphadenopathy  Pulmonary/Chest: Clear but diminished t/o without rales, wheezing or rhonchi, normal air movement, no respiratory distress or retractions, requires no supplemental oxygen. Denies sob at rest and cough. He acknowledges intermittent wheezing. Cardiovascular: normal rate, regular rhythm, normal S1 and S2, no murmurs, rubs, clicks, or gallops, distal pulses intact  Abdomen: soft, non-tender, non-distended, bowel sounds physiologic,  no rebound or guarding, no masses or hernias noted. Liver and spleen without enlargement. Cellulitis to right side of lower abdomen. Extremities: no cyanosis, clubbing. Chronic, 3+ pitting edema to BLE. Scrotal edema is present. Musculoskeletal: No joint swelling or gross deformity; bed ridden until his custom wheelchair is available. Neuro:  Alert, 4/5 strength to BUE.   BLE with 1/5 strength, normal speech, no focal findings or movement disorder noted  Psych:  Normal affect without evidence of depression or anxiety, insight and judgement are intact, memory appears intact. He is routinely pleasant and communicates well with staff. Staff take good care of him. Skin: warm and dry, no rash or erythema    ASSESSMENT & PLAN:    1. Acute cellulitis of the right abdomen  April KELSY Yi started a course of Doxycycline this weekend and he reports his redness and warmth is down. Continue Doxycycline. He denies fevers and chills. 1.  Acute on chronic scrotal edema  Pt's scrotal edema continues to be a challenge. He continues to elevate scrotum onto a rolled cloth. He denies inability to void, fevers, chills. Continue Bumex 2.5 mg po bid and Bumex 2.0 mg po once daily. Initiating metolazone 5.0 mg po 30 minutes prior to 14:00 dosage of Bumex. BMP tomorrow morning. Continue daily weights. It is noted that he often refuses to be weighed. Continue to monitor. Disposition:  Assessment and plan as stated above. Follow-up per routine and as warranted. Plan of care reviewed by Dr. Jacobo Guadarrama DO.   Electronically signed by GAYATHRI Barrientos NP on 1/13/2020 at 4:08 PM

## 2020-01-14 ENCOUNTER — OUTSIDE SERVICES (OUTPATIENT)
Dept: FAMILY MEDICINE CLINIC | Age: 40
End: 2020-01-14
Payer: MEDICARE

## 2020-01-14 PROBLEM — Z91.199 PERSONAL HISTORY OF NONCOMPLIANCE WITH MEDICAL TREATMENT, PRESENTING HAZARDS TO HEALTH: Status: ACTIVE | Noted: 2020-01-14

## 2020-01-14 PROBLEM — R60.1 ANASARCA: Status: ACTIVE | Noted: 2020-01-14

## 2020-01-14 PROCEDURE — 99309 SBSQ NF CARE MODERATE MDM 30: CPT | Performed by: NURSE PRACTITIONER

## 2020-01-14 NOTE — PROGRESS NOTES
[facility] Progress Note    NAME: Brenda Landry  DATE: 20  ROOM #: A 08-2  : 1980  REASON FOR VISIT: Other (Acute visit for anasarca and scrotal edema)    CODE STATUS: Full Code    History obtained from chart review, the patient and staff. SUBJECTIVE:  HPI: Silverio Moss is a 44 y.o. male. Patient has a PMH significant for:  Past Medical History:   Diagnosis Date    Diabetes ([address]     HTN (hypertension)     Insomnia     Morbid obesity ([address]     Pressure ulcer      Pt seen and examined at bedside today for chronic anasarca and scrotal edema. He is in bed at time of assessment and is noted to be in no acute distress. Staff provide appropriate updates; appreciate staff input. I have reviewed patients past medical, surgical, social, and family history and have made updates where appropriate. Allergies and Medications were reviewed through the [facility] EMR.     Patient Active Problem List    Diagnosis Date Noted    Personal history of noncompliance with medical treatment, presenting hazards to health 2020    Anasarca 2020    Diabetes mellitus ([address] 2020    Scrotal edema 2019    Bilateral leg edema 10/09/2019    Major depressive disorder, recurrent episode, moderate ([address] 2019    Colostomy status ([address] 2019    Generalized muscle weakness 2019    Other chronic pain 2019    Morbid obesity ([address]     Diabetes ([address]     HTN (hypertension)     Insomnia     Nursing home resident 2018    Morbid obesity due to excess calories ([address] 2018    Stage IV pressure ulcer of sacral region ([address] 2018       REVIEW OF SYSTEMS  Positive responses are highlighted in bold  Constitutional:  Fever, Chills, Night Sweats, Fatigue, Unexpected changes in weight  HENT:  Ear pain, Tinnitus, Nosebleeds, Trouble swallowing, Hearing loss, Sore throat  Cardiovascular:  Chest Pain, Palpitations, Orthopnea, Paroxysmal Nocturnal Dyspnea  Respiratory: Cough, Occasional Wheezing, Shortness of breath, Chest tightness, Apnea  Gastrointestinal:  Nausea, Vomiting, Diarrhea, Constipation, Heartburn, Blood in stool  Genitourinary:  Difficulty or painful urination, Flank pain, Change in frequency, Urgency; scrotal edema  Skin:  Color change, Rash, Itching, Wound  Musculoskeletal:  Joint pain, Back pain, Gait problems, Joint swelling, Myalgias  Neurological:  Dizziness, Headaches, Presyncope, Numbness, Seizures, Tremors  Endocrine:  Heat Intolerance, Cold Intolerance, Polydipsia, Polyphagia, Polyuria    OBJECTIVE:  PHYSICAL EXAM:  VS:  145/71, 99.6, 97, 20, 157 blood glucose, 92% on room air  Weight in pounds:  457.9 (was 457.0, 445.8, 441.7, 411.3, 416.2, 421.6, 414.4)  Pain: Denies    VS reviewed. General Appearance: morbidly obese, debilitated, in no acute distress  Head: normocephalic and atraumatic  Eyes: conjunctivae and eye lids without erythema  ENT: external ear and ear canal normal bilaterally, nose without deformity, nasal mucosa and turbinates normal without polyps, oropharynx normal, dentition is normal for age, no lip or gum lesions noted  Neck: supple and non-tender without mass, no thyromegaly or thyroid nodules, no cervical lymphadenopathy  Pulmonary/Chest: Clear but diminished t/o without rales, wheezing or rhonchi, normal air movement, no respiratory distress or retractions, requires no supplemental oxygen. Denies sob at rest and cough. He acknowledges intermittent wheezing. Cardiovascular: normal rate, regular rhythm, normal S1 and S2, no murmurs, rubs, clicks, or gallops, distal pulses intact  Abdomen: soft, non-tender, non-distended, bowel sounds physiologic,  no rebound or guarding, no masses or hernias noted. Liver and spleen without enlargement. Cellulitis to right side of lower abdomen. Extremities: no cyanosis, clubbing. Chronic, 3+ pitting edema to BLE. Scrotal edema is present, generalized edema throughout.   Musculoskeletal: No joint swelling or gross deformity; bed ridden until his custom wheelchair is available. Neuro:  Alert, 4/5 strength to BUE. BLE with 1/5 strength, normal speech, no focal findings or movement disorder noted  Psych:  Normal affect without evidence of depression or anxiety, insight and judgement are intact, memory appears intact. He is routinely pleasant and communicates well with staff. Staff take good care of him. Skin: warm and dry, no rash or erythema    ASSESSMENT & PLAN:    1. Acute cellulitis of the right abdomen  April Kd CNP started a course of Doxycycline this weekend and he reports his redness and warmth is down. Continue Doxycycline. He denies fevers and chills. 2.  Chronic anasarca  Patient's generalized edema continues. He refuses to be weighed in the gianfranco lift or be oob into a chair due to scrotal edema which causes pain when he is in the gianfranco. Discussed the need for IV drip bumetanide with Physician and patient. Patient declines to go to the hospital for IV diuresis for anasarca. He agrees to cut down on water intake and fast food intake. Will continue with bumex as scheduled. He received metolazone 5.0 mg po yesterday prior to 14:00 dosage of bumetanide and he states \"it worked really well. He refused weight today. Will repeat metolazone today. BMP tomorrow morning. 1/14 BMP is stable. 3.  Acute on chronic scrotal edema  Pt's scrotal edema continues to be a challenge. He is often non-compliant with elevating scrotum onto a rolled cloth. He denies inability to void, fevers, chills. Continue Bumex 2.5 mg po bid and Bumex 2.0 mg po once daily. Continue daily weights. Continue to monitor. Disposition:  Assessment and plan as stated above. Follow-up per routine and as warranted. Plan of care reviewed by Dr. Michelle Gaming DO.   Electronically signed by GAYATHRI Leo NP on 1/14/2020 at 4:37 PM

## 2020-01-19 ENCOUNTER — OUTSIDE SERVICES (OUTPATIENT)
Dept: FAMILY MEDICINE CLINIC | Age: 40
End: 2020-01-19
Payer: MEDICARE

## 2020-01-19 PROCEDURE — 99309 SBSQ NF CARE MODERATE MDM 30: CPT | Performed by: NURSE PRACTITIONER

## 2020-01-19 NOTE — PROGRESS NOTES
Cough, Occasional Wheezing, Shortness of breath, Chest tightness, Apnea  Gastrointestinal:  Nausea, Vomiting, Diarrhea, Constipation, Heartburn, Blood in stool  Genitourinary:  Difficulty or painful urination, Flank pain, Change in frequency, Urgency; scrotal edema  Skin:  Color change, Rash, Itching, Wound  Musculoskeletal:  Joint pain, Back pain, Gait problems, Joint swelling, Myalgias  Neurological:  Dizziness, Headaches, Presyncope, Numbness, Seizures, Tremors  Endocrine:  Heat Intolerance, Cold Intolerance, Polydipsia, Polyphagia, Polyuria    OBJECTIVE:  PHYSICAL EXAM:  VS:  138/82, 98.0, 123, 20, 177.0, 94% on room air  Weight in pounds:  140.1 (was 457.9, 457.0, 445.8, 441.7, 411.3)  Pain: Denies    VS reviewed. General Appearance: morbidly obese, debilitated, in no acute distress  Head: normocephalic and atraumatic  Eyes: conjunctivae and eye lids without erythema  ENT: external ear and ear canal normal bilaterally, nose without deformity, nasal mucosa and turbinates normal without polyps, oropharynx normal, dentition is normal for age, no lip or gum lesions noted  Neck: supple and non-tender without mass, no thyromegaly or thyroid nodules, no cervical lymphadenopathy  Pulmonary/Chest: Clear but diminished t/o without rales, wheezing or rhonchi, normal air movement, no respiratory distress or retractions, requires no supplemental oxygen. Denies sob at rest and cough. He reports intermittent wheezing is resolved. Cardiovascular: normal rate, regular rhythm, normal S1 and S2, no murmurs, rubs, clicks, or gallops, distal pulses intact  Abdomen: soft, non-tender, non-distended, bowel sounds physiologic,  no rebound or guarding, no masses or hernias noted. Liver and spleen without enlargement. Cellulitis to right side of lower abdomen. Extremities: no cyanosis, clubbing. Chronic, 3+ pitting edema to BLE. Scrotal edema is present, generalized edema throughout is persistent.   Musculoskeletal: No joint swelling or gross deformity; bed ridden until his custom wheelchair is available. Neuro:  Alert, 4/5 strength to BUE. BLE with 1/5 strength, normal speech, no focal findings or movement disorder noted  Psych:  Normal affect without evidence of depression or anxiety, insight and judgement are intact, memory appears intact. He is routinely pleasant and communicates well with staff. Staff take good care of him. Skin: warm and dry, no rash or erythema    ASSESSMENT & PLAN:    1. Acute cellulitis of the right abdomen  April Kd CNP started a course of Doxycycline this weekend and he reports his redness and warmth is down. Continue Doxycycline. He denies fevers and chills. 2.  Chronic anasarca  Patient's generalized edema continues. He routinely refuses to be weighed in the gianfranco lift or be oob into a chair due to scrotal edema which causes pain when he is in the gianfranco. He did permit weight on 8/47 and is being more compliant with daily weights for now. Will continue with bumex as scheduled. He received metolazone 5.0 mg po once daily x 3 days and reported it \"worked really well\". BMPs have been stable. 3.  Acute on chronic scrotal edema  Pt's scrotal edema continues to be a challenge. But it seems to be a bit improved today. He is often non-compliant with elevating scrotum onto a rolled cloth. He denies inability to void, fevers, chills. Continue Bumex 2.5 mg po bid and Bumex 2.0 mg po once daily. Continue daily weights. Continue to monitor. Disposition:  Assessment and plan as stated above. Follow-up per routine and as warranted. Plan of care reviewed by Dr. Stephanie Guzman DO.   Electronically signed by GAYATHRI Singh NP on 1/19/2020 at 1:33 PM

## 2020-01-20 ENCOUNTER — OUTSIDE SERVICES (OUTPATIENT)
Dept: FAMILY MEDICINE CLINIC | Age: 40
End: 2020-01-20
Payer: MEDICARE

## 2020-01-20 PROCEDURE — 99309 SBSQ NF CARE MODERATE MDM 30: CPT | Performed by: NURSE PRACTITIONER

## 2020-01-20 NOTE — PROGRESS NOTES
[address]    NAME: Sunil Landry  DATE: 20  ROOM #: A 08-2  : 1980  REASON FOR VISIT: Other (F/U visit for anasarca and scrotal edema)    CODE STATUS: Full Code    History obtained from chart review, the patient and staff. SUBJECTIVE:  HPI: Meme Orellana is a 44 y.o. male. Patient has a PMH significant for:  Past Medical History:   Diagnosis Date    Diabetes ([address]     HTN (hypertension)     Insomnia     Morbid obesity ([address]     Pressure ulcer      Pt seen and examined at bedside today for chronic anasarca and scrotal edema. He is in bed at time of assessment and is noted to be in no acute distress. Staff provide appropriate updates; appreciate staff input. I have reviewed patients past medical, surgical, social, and family history and have made updates where appropriate. Allergies and Medications were reviewed through the [facility] EMR.     Patient Active Problem List    Diagnosis Date Noted    Personal history of noncompliance with medical treatment, presenting hazards to health 2020    Anasarca 2020    Diabetes mellitus ([address] 2020    Scrotal edema 2019    Bilateral leg edema 10/09/2019    Major depressive disorder, recurrent episode, moderate ([address] 2019    Colostomy status ([address] 2019    Generalized muscle weakness 2019    Other chronic pain 2019    Morbid obesity ([address]     Diabetes ([address]     HTN (hypertension)     Insomnia     Nursing home resident 2018    Morbid obesity due to excess calories ([address] 2018    Stage IV pressure ulcer of sacral region ([address] 2018       REVIEW OF SYSTEMS  Positive responses are highlighted in bold  Constitutional:  Fever, Chills, Night Sweats, Fatigue, Unexpected changes in weight  HENT:  Ear pain, Tinnitus, Nosebleeds, Trouble swallowing, Hearing loss, Sore throat  Cardiovascular:  Chest Pain, Palpitations, Orthopnea, Paroxysmal Nocturnal Dyspnea  Respiratory: Cough, Occasional Wheezing, Shortness of breath, Chest tightness, Apnea  Gastrointestinal:  Nausea, Vomiting, Diarrhea, Constipation, Heartburn, Blood in stool  Genitourinary:  Difficulty or painful urination, Flank pain, Change in frequency, Urgency; scrotal edema  Skin:  Color change, Rash, Itching, Wound  Musculoskeletal:  Joint pain, Back pain, Gait problems, Joint swelling, Myalgias  Neurological:  Dizziness, Headaches, Presyncope, Numbness, Seizures, Tremors  Endocrine:  Heat Intolerance, Cold Intolerance, Polydipsia, Polyphagia, Polyuria    OBJECTIVE:  PHYSICAL EXAM:  VS:  129/86, 98.1, 107 18, 143 blood glucose, 94% on room air. Weight in pounds:  140.1 (was 457.9, 457.0, 445.8, 441.7, 411.3)  Pain: Denies    VS reviewed. General Appearance: morbidly obese, debilitated, in no acute distress  Head: normocephalic and atraumatic  Eyes: conjunctivae and eye lids without erythema  ENT: external ear and ear canal normal bilaterally, nose without deformity, nasal mucosa and turbinates normal without polyps, oropharynx normal, dentition is normal for age, no lip or gum lesions noted  Neck: supple and non-tender without mass, no thyromegaly or thyroid nodules, no cervical lymphadenopathy  Pulmonary/Chest: Clear but diminished t/o without rales, wheezing or rhonchi, normal air movement, no respiratory distress or retractions, requires no supplemental oxygen. Denies sob at rest and cough. He reports intermittent wheezing is resolved. Cardiovascular: normal rate, regular rhythm, normal S1 and S2, no murmurs, rubs, clicks, or gallops, distal pulses intact  Abdomen: soft, non-tender, non-distended, bowel sounds physiologic,  no rebound or guarding, no masses or hernias noted. Liver and spleen without enlargement. Cellulitis to right side of lower abdomen. Extremities: no cyanosis, clubbing. Chronic, 3+ pitting edema to BLE. Scrotal edema is improved.   Generalized edema throughout BUE is

## 2020-01-23 ENCOUNTER — OUTSIDE SERVICES (OUTPATIENT)
Dept: FAMILY MEDICINE CLINIC | Age: 40
End: 2020-01-23
Payer: MEDICARE

## 2020-01-23 PROCEDURE — 99309 SBSQ NF CARE MODERATE MDM 30: CPT | Performed by: NURSE PRACTITIONER

## 2020-01-23 NOTE — PROGRESS NOTES
[facility] Progress Note    NAME: Joe Landry  DATE: 20  ROOM #: A 08-2  : 1980  REASON FOR VISIT: Other (routine visit)    CODE STATUS: Full Code    History obtained from chart review, the patient and staff. SUBJECTIVE:  HPI: Jl Herndon is a 44 y.o. male. Patient has a PMH significant for:  Past Medical History:   Diagnosis Date    Diabetes ([address]     HTN (hypertension)     Insomnia     Morbid obesity ([address]     Pressure ulcer      Pt seen and examined at bedside today and is noted to be in no acute distress. Staff provide appropriate updates; appreciate staff input. I have reviewed patients past medical, surgical, social, and family history and have made updates where appropriate. Allergies and Medications were reviewed through the [facility] EMR.     Patient Active Problem List    Diagnosis Date Noted    Personal history of noncompliance with medical treatment, presenting hazards to health 2020    Anasarca 2020    Diabetes mellitus ([address] 2020    Scrotal edema 2019    Bilateral leg edema 10/09/2019    Major depressive disorder, recurrent episode, moderate ([address] 2019    Colostomy status ([address] 2019    Generalized muscle weakness 2019    Other chronic pain 2019    Morbid obesity ([address]     Diabetes ([address]     HTN (hypertension)     Insomnia     Nursing home resident 2018    Morbid obesity due to excess calories ([address] 2018    Stage IV pressure ulcer of sacral region ([address] 2018       REVIEW OF SYSTEMS  Positive responses are highlighted in bold  Constitutional:  Fever, Chills, Night Sweats, Fatigue, Unexpected changes in weight  HENT:  Ear pain, Tinnitus, Nosebleeds, Trouble swallowing, Hearing loss, Sore throat  Cardiovascular:  Chest Pain, Palpitations, Orthopnea, Paroxysmal Nocturnal Dyspnea  Respiratory:  Cough, Wheezing, Shortness of breath, Chest tightness, Apnea  Gastrointestinal:  Nausea, Vomiting, persists. He declines fluid restriction. Continue Bumex 2.5 mg po twice daily and 2 mg po once daily. His kidneys have tolerated large dosages of bumex. Continue ACE wraps at HS for now. Continue daily weights. 8.  Major depressive disorder, recurrent. Patient looks good today. He reports feeling very well and that his depression is much improved. He smiles and makes eye contact. He engages in conversation. He continues to decline consultation to Psych and Counseling services. Continue current dosage of Zoloft 100 mg po once daily. Will look at decreasing dosage of Zoloft once he is up in his customized wheelchair more often. Antipsychotic/Antianiety/Hypnotic/Psychotropic/Sedation/Antidepressant medications are continued at this time because discontinuation may result in adverse effects of return or concerning behaviors/symptoms. 9.  Acute cellulitis of the right abdomen  Completed a course of doxycycline. Cellulitis is resolved. 10.  Chronic anasarca  Patient's generalized edema continues but is improved in the BUE. He is compliant with ace wraps to BLE. Will continue with bumex as scheduled. Completed a course of metolazone 2.5 mg po once daily x 3 days. 11.  Acute on chronic scrotal edema  Pt's scrotal edema continues to improve. Plan as stated above. Disposition:  Assessment and plan as stated above. Follow-up per routine and as warranted. Plan of care reviewed by Dr. Zaki Longoria DO.   Electronically signed by GAYATHRI Haines NP on 1/23/2020 at 5:03 PM

## 2020-01-29 ENCOUNTER — OUTSIDE SERVICES (OUTPATIENT)
Dept: FAMILY MEDICINE CLINIC | Age: 40
End: 2020-01-29
Payer: MEDICARE

## 2020-01-29 PROCEDURE — 99308 SBSQ NF CARE LOW MDM 20: CPT | Performed by: NURSE PRACTITIONER

## 2020-02-10 ENCOUNTER — OUTSIDE SERVICES (OUTPATIENT)
Dept: FAMILY MEDICINE CLINIC | Age: 40
End: 2020-02-10
Payer: MEDICARE

## 2020-02-10 PROCEDURE — 99309 SBSQ NF CARE MODERATE MDM 30: CPT | Performed by: NURSE PRACTITIONER

## 2020-02-10 NOTE — PROGRESS NOTES
[address] Progress Note    NAME: Iraida Landry  DATE: 2/10/20  ROOM #: A 08-2  : 1980  REASON FOR VISIT: Other (Acute visit for anasarca and scrotal edema)    CODE STATUS: Full Code    History obtained from chart review, the patient and staff. SUBJECTIVE:  HPI: Annika Mckeon is a 44 y.o. male. Patient has a PMH significant for:  Past Medical History:   Diagnosis Date    Diabetes ([address]     HTN (hypertension)     Insomnia     Morbid obesity ([address]     Pressure ulcer      Pt seen and examined at bedside today for chronic leg and scrotal edema. He is in bed at time of assessment and is noted to be in no acute distress. Staff provide appropriate updates; appreciate staff input. I have reviewed patients past medical, surgical, social, and family history and have made updates where appropriate. Allergies and Medications were reviewed through the [facility] EMR.     Patient Active Problem List    Diagnosis Date Noted    Personal history of noncompliance with medical treatment, presenting hazards to health 2020    Anasarca 2020    Diabetes mellitus ([address] 2020    Scrotal edema 2019    Bilateral leg edema 10/09/2019    Major depressive disorder, recurrent episode, moderate ([address] 2019    Colostomy status ([address] 2019    Generalized muscle weakness 2019    Other chronic pain 2019    Morbid obesity ([address]     Diabetes ([address]     HTN (hypertension)     Insomnia     Nursing home resident 2018    Morbid obesity due to excess calories ([address] 2018    Stage IV pressure ulcer of sacral region ([address] 2018       REVIEW OF SYSTEMS  Positive responses are highlighted in bold  Constitutional:  Fever, Chills, Night Sweats, Fatigue, Unexpected changes in weight  HENT:  Ear pain, Tinnitus, Nosebleeds, Trouble swallowing, Hearing loss, Sore throat  Cardiovascular:  Chest Pain, Palpitations, Orthopnea, Paroxysmal Nocturnal Dyspnea; leg edema  Respiratory:  Cough, Occasional Wheezing, Shortness of breath, Chest tightness, Apnea  Gastrointestinal:  Nausea, Vomiting, Diarrhea, Constipation, Heartburn, Blood in stool  Genitourinary:  Difficulty or painful urination, Flank pain, Change in frequency, Urgency; scrotal edema  Skin:  Color change, Rash, Itching, Wound  Musculoskeletal:  Joint pain, Back pain, Gait problems, Joint swelling, Myalgias  Neurological:  Dizziness, Headaches, Presyncope, Numbness, Seizures, Tremors  Endocrine:  Heat Intolerance, Cold Intolerance, Polydipsia, Polyphagia, Polyuria    OBJECTIVE:  PHYSICAL EXAM:  VS:  113/81, 98.3, 99, 18, 152 blood glucose, 96% on room air  Weight in pounds:  373.9 (was 438.9, 436.0, 431.0, 457.9, 457.0, 445.8, 441.7, 411.3, 416.2, 421.6, 414.4)  Pain: Denies    VS reviewed. General Appearance: morbidly obese, debilitated, in no acute distress  Head: normocephalic and atraumatic  Eyes: conjunctivae and eye lids without erythema  ENT: external ear and ear canal normal bilaterally, nose without deformity, nasal mucosa and turbinates normal without polyps, oropharynx normal, dentition is normal for age, no lip or gum lesions noted  Neck: supple and non-tender without mass, no thyromegaly or thyroid nodules, no cervical lymphadenopathy  Pulmonary/Chest: Diminished t/o, no rales or rhonchi, . He has expiratory wheezing to anterior lobes. Normal air movement, no respiratory distress or retractions, requires no supplemental oxygen. Denies sob at rest and cough. He acknowledges intermittent wheezing at times. No sob at rest.  Cardiovascular: normal rate, regular rhythm, normal S1 and S2, no murmurs, rubs, clicks, or gallops, distal pulses intact  Abdomen: soft, non-tender, non-distended, bowel sounds physiologic,  no rebound or guarding, no masses or hernias noted. Liver and spleen without enlargement. Cellulitis to right side of lower abdomen is resolved. Extremities: no cyanosis, clubbing.

## 2020-02-13 ENCOUNTER — OUTSIDE SERVICES (OUTPATIENT)
Dept: FAMILY MEDICINE CLINIC | Age: 40
End: 2020-02-13
Payer: MEDICARE

## 2020-02-13 PROCEDURE — 99309 SBSQ NF CARE MODERATE MDM 30: CPT | Performed by: NURSE PRACTITIONER

## 2020-02-13 NOTE — PROGRESS NOTES
[address] Progress Note    NAME: Maureen Landry  DATE: 20  ROOM #: A 08-2  : 1980  REASON FOR VISIT: Other (F/U visit for anasarca and scrotal edema and to discuss dietary noncompliance)    CODE STATUS: Full Code    History obtained from chart review, the patient and staff. SUBJECTIVE:  HPI: Radha Romo is a 44 y.o. male. Patient has a PMH significant for:  Past Medical History:   Diagnosis Date    Diabetes ([address]     HTN (hypertension)     Insomnia     Morbid obesity ([address]     Pressure ulcer      Pt seen and examined at bedside today for chronic leg and scrotal edema. He is in bed at time of assessment and is noted to be in no acute distress. Staff provide appropriate updates; appreciate staff input. I have reviewed patients past medical, surgical, social, and family history and have made updates where appropriate. Allergies and Medications were reviewed through the [facility] EMR.     Patient Active Problem List    Diagnosis Date Noted    Personal history of noncompliance with medical treatment, presenting hazards to health 2020    Anasarca 2020    Diabetes mellitus ([address] 2020    Scrotal edema 2019    Bilateral leg edema 10/09/2019    Major depressive disorder, recurrent episode, moderate ([address] 2019    Colostomy status ([address] 2019    Generalized muscle weakness 2019    Other chronic pain 2019    Morbid obesity ([address]     Diabetes ([address]     HTN (hypertension)     Insomnia     Nursing home resident 2018    Morbid obesity due to excess calories ([address] 2018    Stage IV pressure ulcer of sacral region ([address] 2018       REVIEW OF SYSTEMS  Positive responses are highlighted in bold  Constitutional:  Fever, Chills, Night Sweats, Fatigue, Unexpected changes in weight  HENT:  Ear pain, Tinnitus, Nosebleeds, Trouble swallowing, Hearing loss, Sore throat  Cardiovascular:  Chest Pain, Palpitations, Orthopnea, Paroxysmal 3+ pitting edema to BLE. Scrotal edema is present, generalized edema throughout. Cellulitis to right lower leg is resolved. Musculoskeletal: No joint swelling or gross deformity; bed ridden until his custom wheelchair is available. Neuro:  Alert, 4/5 strength to BUE. BLE with 1/5 strength, normal speech, no focal findings or movement disorder noted  Psych:  Normal affect without evidence of depression or anxiety, insight and judgement are intact, memory appears intact. He is routinely pleasant and communicates well with staff. Staff take good care of him. Skin: warm and dry, no rash or erythema    ASSESSMENT & PLAN:    1. Acute on chronic Anasarca  He continues to struggle with fluid retention. He is non-compliant with diet and fluid restrictions. Edema to BLE persists. Discussed trying tubigrip product with Wound Nurse. Ace wraps are in place to BLE at time of assessment. He declines consultation to cardiology and lymph edema clinic. He declines to go to the hospital for IV diuresis for anasarca. Continue multiple dosages of bumetanide totaling 9 mg po total daily. Has completed a course of metolazone 5 mg po once daily on Mon, Tues, Wed 30 minutes prior to noon dose of bumex. BMP on Thursday is pending. 2.  Acute on chronic scrotal edema  Scrotal edema persists. Continue to encourage pt to elevate scrotum. Scrotum is not elevated at time of assessment this morning. He is often non-compliant with elevating scrotum onto a rolled cloth. He denies inability to void, fevers, chills. Continue daily weights. Continue to monitor. 3.  History of noncompliance with dietary recommendations. He updates that his brother and parents bring food in from home. He enjoys Donordonut. He has soda once per week. Encouraged him to limit take-out food to 1/2 portions 2-3 days per week. Will discuss options such as  salad that the facility could provide for him routinely.   Continue to encourage. Disposition:  Assessment and plan as stated above. Follow-up per routine and as warranted. Plan of care reviewed by Dr. Thaddeus Bass DO.   Electronically signed by GAYATHRI Pink NP on 2/13/2020 at 10:18 AM

## 2020-02-16 ENCOUNTER — OUTSIDE SERVICES (OUTPATIENT)
Dept: FAMILY MEDICINE CLINIC | Age: 40
End: 2020-02-16
Payer: MEDICARE

## 2020-02-16 PROCEDURE — 99308 SBSQ NF CARE LOW MDM 20: CPT | Performed by: NURSE PRACTITIONER

## 2020-02-16 NOTE — PROGRESS NOTES
[address] [facility] Progress Note    NAME: Rose Mary Landry  DATE: 20  ROOM #: A 08-2  : 1980  REASON FOR VISIT: Other (F/U visit for anasarca and scrotal edema)    CODE STATUS: Full Code    History obtained from chart review, the patient and staff. SUBJECTIVE:  HPI: Jonathan Miller is a 44 y.o. male. Patient has a PMH significant for:  Past Medical History:   Diagnosis Date    Diabetes ([address]     HTN (hypertension)     Insomnia     Morbid obesity ([address]     Pressure ulcer      Pt seen and examined at bedside today for chronic leg and scrotal edema. He is in bed at time of assessment and is noted to be in no acute distress. Staff provide appropriate updates; appreciate staff input. I have reviewed patients past medical, surgical, social, and family history and have made updates where appropriate. Allergies and Medications were reviewed through the [facility] EMR.     Patient Active Problem List    Diagnosis Date Noted    Personal history of noncompliance with medical treatment, presenting hazards to health 2020    Anasarca 2020    Diabetes mellitus ([address] 2020    Scrotal edema 2019    Bilateral leg edema 10/09/2019    Major depressive disorder, recurrent episode, moderate ([address] 2019    Colostomy status ([address] 2019    Generalized muscle weakness 2019    Other chronic pain 2019    Morbid obesity ([address]     Diabetes ([address]     HTN (hypertension)     Insomnia     Nursing home resident 2018    Morbid obesity due to excess calories ([address] 2018    Stage IV pressure ulcer of sacral region ([address] 2018       REVIEW OF SYSTEMS  Positive responses are highlighted in bold  Constitutional:  Fever, Chills, Night Sweats, Fatigue, Unexpected changes in weight  HENT:  Ear pain, Tinnitus, Nosebleeds, Trouble swallowing, Hearing loss, Sore throat  Cardiovascular:  Chest Pain, Palpitations, Orthopnea, Paroxysmal Nocturnal Dyspnea; leg to BLE. Scrotal edema is present, generalized edema throughout. Cellulitis to right lower leg is resolved. Musculoskeletal: No joint swelling or gross deformity; bed ridden until his custom wheelchair is available. Neuro:  Alert, 4/5 strength to BUE. BLE with 1/5 strength, normal speech, no focal findings or movement disorder noted  Psych:  Normal affect without evidence of depression or anxiety, insight and judgement are intact, memory appears intact. He is routinely pleasant and communicates well with staff. Staff take good care of him. Skin: warm and dry, no rash or erythema    ASSESSMENT & PLAN:    1. Acute on chronic Anasarca  He continues to struggle with fluid retention. He is non-compliant with diet and fluid restrictions. Edema to BLE persists. Discussed trying tubigrip product with Wound Nurse. Ace wraps are in place to BLE at time of assessment. He declines consultation to cardiology and lymph edema clinic. He declines to go to the hospital for IV diuresis for anasarca. Continue multiple dosages of bumetanide totaling 9 mg po total daily. Weight down a few pounds today and upper arm edema is improved a bit. Initiating HCTZ 12.5 mg po once daily. Continue frequent BMP monitoring. 2.  Acute on chronic scrotal edema  Scrotal edema persists. Continue to encourage pt to elevate scrotum. He is often non-compliant with elevating scrotum onto a rolled cloth. He denies inability to void, fevers, chills. Continue to monitor. Disposition:  Assessment and plan as stated above. Follow-up per routine and as warranted. Plan of care reviewed by Dr. Jorge Luis Delarosa DO.   Electronically signed by GAYATHRI Hanks NP on 2/16/2020 at 6:40 PM

## 2020-02-19 VITALS
SYSTOLIC BLOOD PRESSURE: 111 MMHG | DIASTOLIC BLOOD PRESSURE: 55 MMHG | WEIGHT: 315 LBS | HEART RATE: 118 BPM | RESPIRATION RATE: 18 BRPM | TEMPERATURE: 98.3 F

## 2020-02-19 NOTE — PROGRESS NOTES
Hilda Augustine is a 44 y.o. male that is being seen at [facility] for Diabetes      Willy Landry  1980 2/19/20      HPI:  Patient seen and examined at bedside. History obtain from patient and chart. Today, patient states that he is doing better. He is still having a lot of LE edema but feels that it is improving with compression. Has not been very compliant with diet and we discussed this today. Otherwise doing well. No constipation, pain. I have reviewed the patient's past medical history, past surgical history, allergies,medications, social and family history and I have made updates where appropriate. Past Medical History:   Diagnosis Date    Diabetes ([address]     HTN (hypertension)     Insomnia     Morbid obesity (HCC)     Pressure ulcer        No past surgical history on file. Social History     Tobacco Use    Smoking status: Not on file   Substance Use Topics    Alcohol use: Not on file    Drug use: Not on file       No family history on file. Review of Systems        PHYSICAL EXAM:    BP (!) 111/55   Pulse 118   Temp 98.3 °F (36.8 °C)   Resp 18   Wt (!) 457 lb (207.3 kg)       Physical Exam  Vitals signs and nursing note reviewed. Constitutional:       General: He is not in acute distress. Appearance: He is well-developed. HENT:      Head: Normocephalic and atraumatic. Right Ear: Hearing and external ear normal.      Left Ear: Hearing and external ear normal.      Nose: Nose normal.   Eyes:      General:         Right eye: No discharge. Left eye: No discharge. Conjunctiva/sclera: Conjunctivae normal.   Neck:      Musculoskeletal: Normal range of motion and neck supple. Thyroid: No thyromegaly. Trachea: No tracheal deviation. Cardiovascular:      Rate and Rhythm: Normal rate and regular rhythm. Heart sounds: Normal heart sounds. No murmur. No friction rub. No gallop.     Pulmonary:      Effort: Pulmonary effort is normal. No

## 2020-02-21 ENCOUNTER — OUTSIDE SERVICES (OUTPATIENT)
Dept: FAMILY MEDICINE CLINIC | Age: 40
End: 2020-02-21
Payer: MEDICARE

## 2020-02-21 PROCEDURE — 99308 SBSQ NF CARE LOW MDM 20: CPT | Performed by: FAMILY MEDICINE

## 2020-02-25 ENCOUNTER — OUTSIDE SERVICES (OUTPATIENT)
Dept: FAMILY MEDICINE CLINIC | Age: 40
End: 2020-02-25
Payer: MEDICARE

## 2020-02-25 PROCEDURE — 99309 SBSQ NF CARE MODERATE MDM 30: CPT | Performed by: NURSE PRACTITIONER

## 2020-03-11 ENCOUNTER — OUTSIDE SERVICES (OUTPATIENT)
Dept: FAMILY MEDICINE CLINIC | Age: 40
End: 2020-03-11
Payer: MEDICARE

## 2020-03-11 PROCEDURE — 99356 PR PROLONGED SVC I/P OR OBS SETTING 1ST HOUR: CPT | Performed by: NURSE PRACTITIONER

## 2020-03-11 PROCEDURE — 99309 SBSQ NF CARE MODERATE MDM 30: CPT | Performed by: NURSE PRACTITIONER

## 2020-03-11 NOTE — PROGRESS NOTES
apparently had some problems in passing his urine this is a 70 which is associated with swelling of the of the testicles. Patient stated he pain in testicles because the structure was trapped between his legs. The patient was noted to have darkening of the wound in several areas which was of some concern to the nursing home when they performed his dressing changes. The patient had an elevation of his white blood cell count to 26,000 and it was suspected the patient has an infection. The infection was thought to be in the sacral area by the ER staff however they could not turn the patient over to see the wound. Indeed this could not be done on the floor either. Therefore the patient is been admitted for this problem with possible infection in the sacral area as noted. The patient's wound was as follows: * The patient's wound was not infected. The problem for the wound was that he is bleeding inside the subcutaneous tissue and dermis. The darkened areas were coagulated blood which is come to a flattened surface due to his weight. * The area was cleansed with that removal of the upper dermis the patient had 3 pumping blood vessels. The patient's bled considerably from the area due to active pumping vasculature. To the vessels could be closed with compression. Third had to be cauterized. The problem for the patient is that his dressing was much too dry and when they pulled the dressing off it ripped the tissue. This led to bleeding and this was noted profuse. * The patient was managed with hydrogen peroxide wet-to-dry dressings. The patient's white count went down on its own from 26,000 to 13,000 after removal of the damaged and coagulated material in the wound bed. The wound was noted quite granular and satisfactory as noted. The patient had no evidence of an acute infection The nursing home requested the patient have an evaluation for his heart.  The patient is being seen by cardiology at the request of the institution Dyspnea  Respiratory:  Cough, Wheezing, Shortness of breath, Chest tightness, Apnea  Gastrointestinal:  Nausea, Vomiting, Diarrhea, Constipation, Heartburn, Blood in stool  Genitourinary:  Difficulty or painful urination, Flank pain, Change in frequency, Urgency  Skin:  Color change, Rash, Itching, Wound  Musculoskeletal:  Joint pain, Back pain, Gait problems, Joint swelling, Myalgias  Neurological:  Dizziness, Headaches, Presyncope, Numbness, Seizures, Tremors  Endocrine:  Heat Intolerance, Cold Intolerance, Polydipsia, Polyphagia, Polyuria    OBJECTIVE:  PHYSICAL EXAM:  VS:  103/54, 98.7, 84, 22, 109 blood glucose, 95% on room air  Weight in pounds:  443.5 (was 435.4, 445.8, 441.7)  Pain: Denies    VS reviewed. General Appearance: morbidly obese, debilitated, in no acute distress  Head: normocephalic and atraumatic  Eyes: conjunctivae and eye lids without erythema  ENT: external ear and ear canal normal bilaterally, nose without deformity, nasal mucosa and turbinates normal without polyps, oropharynx normal, dentition is normal for age, no lip or gum lesions noted  Neck: supple and non-tender without mass, no thyromegaly or thyroid nodules, no cervical lymphadenopathy  Pulmonary/Chest: clear but diminished to auscultation bilaterally- no wheezes, rales or rhonchi, normal air movement, no respiratory distress or retractions, requires no supplemental oxygen  Cardiovascular: normal rate, regular rhythm, normal S1 and S2, no murmurs, rubs, clicks, or gallops, distal pulses intact  Abdomen: soft, non-tender, non-distended, bowel sounds physiologic,  no rebound or guarding, no masses or hernias noted. Liver and spleen without enlargement. Extremities: no cyanosis, clubbing. Chronic, 3+ pitting edema to BLE. Musculoskeletal: No joint swelling or gross deformity; bed ridden until his custom wheelchair is available. Neuro:  Alert, 4/5 strength to BUE.   BLE with 0/5 strength, normal speech, no focal findings or movement disorder noted  Psych:  Normal affect without evidence of depression or anxiety, insight and judgement are intact, memory appears intact. He is routinely pleasant and communicates well with staff. Staff take good care of him. Skin: warm and dry, no rash or erythema    ASSESSMENT & PLAN:    1. Essential hypertension  BP is at goal with Metoprolol, Lisinopril. He denies chest pain, pressure, heaviness. No episodes of hypotension or bradycardia. 2. Other specified diabetes mellitus with other specified complication, without long-term current use of insulin ([facility])  Continue Metformin, Januvia, sliding scale Humalog, Detemi  No episodes of hypoglycemia. A1C per routine. Continues to be noncompliant with diet. 3. Decubitus ulcer of sacral region, stage 4 ([facility])  Current with Dr. Yvonne Tirado, ID. Appreciate Wound Nurse's input. Asking staff to continue to monitor. 4. Morbid obesity due to excess calories ([address]  Continues to gain weight. Appetite good. Noncompliant with diabetic diet. Encourage patient to be oob in wheelchair on a daily basis. He continues to refuse. 5. Other chronic pain  Continue prn Oxycodone and scheduled Tylenol. He reports pain is controlled with this current medication regimen. Patient states his pain is improving as his wound heals. 6. Primary insomnia  Continue home dose of Ambien. Denies uncontrolled insomnia. 7.  Bilateral lower leg edema  3+ pitting edema persists. He declines fluid restriction. Continue Bumex, metolazone, HCTZ. Continue to monitor kidney function closely. Continue daily weights. 8.  Major depressive disorder, recurrent. No SI/HI. He smiles and makes eye contact. He engages in conversation. He continues to decline consultation to Psych and Counseling services. Continue current dosage of Zoloft 100 mg po once daily.   Will look at decreasing dosage of Zoloft once he is up in his customized wheelchair more often. Antipsychotic/Antianiety/Hypnotic/Psychotropic/Sedation/Antidepressant medications are continued at this time because discontinuation may result in adverse effects of return or concerning behaviors/symptoms. 9.  Acute cellulitis of the right abdomen  Completed a course of doxycycline. Cellulitis is resolved. 10.  Chronic anasarca  Patient's generalized edema continues but is improved in the BUE. He is compliant with ace wraps to BLE. Will continue with bumex as scheduled. Completed a course of metolazone 2.5 mg po once daily x 3 days. 11.  Acute on chronic scrotal edema  Pt's scrotal edema continues. Elevate scrotum as tolerates. 12.  Hyperlipidemia  ASA, statin. Disposition:  Assessment and plan as stated above. Follow-up per routine and as warranted. Total time:  45 minutes    Plan of care reviewed by Dr. Luan Ba DO.   Electronically signed by GAYATHRI Holcomb NP on 3/11/2020 at 3:12 PM

## 2020-03-12 VITALS
HEART RATE: 104 BPM | SYSTOLIC BLOOD PRESSURE: 140 MMHG | DIASTOLIC BLOOD PRESSURE: 63 MMHG | TEMPERATURE: 97.9 F | RESPIRATION RATE: 18 BRPM | WEIGHT: 315 LBS

## 2020-03-12 NOTE — PROGRESS NOTES
Shahida Ambrocio is a 44 y.o. male that is being seen at [facility] for Other (leukocytosis)      Marilyn Landry  1980  3/12/20      HPI:  Patient seen and examined at bedside. History obtain from patient and chart. Patient was recently admitted to [facility] for treatment of infected sacral wound. Per DC summary: This patient, 70-year-old morbidly obese male with a BMI of 60, has been admitted to the hospital. Patient is usually seen by the service at the nursing home because is very difficult for him to be brought by the clinics. The EMS would not bring patients to the clinic as this sort of visit is not covered by insurance. Therefore the patient has no way of ambulation and is basically maintained within the ECF. The problem for the patient is a cannot take care of himself at home due to the fact he lacks mobility. Patient does have a pinched nerve which hampers his ability to ambulate. He has been seen by neurosurgery for his nerve problem but the possibly of having intervention is no unless he loses several hundred pounds. The patient is lost approximately 50 as his weight used to be over 500 pounds. He now comes in in 450 pounds. Patient has a decubitus ulcer which is much improved versus previous presentation. The area has been debrided at the nursing home and he has had not any systemic findings appreciated. The patient apparently had some problems in passing his urine this is a 70 which is associated with swelling of the of the testicles. Patient stated he pain in testicles because the structure was trapped between his legs. The patient was noted to have darkening of the wound in several areas which was of some concern to the nursing home when they performed his dressing changes. The patient had an elevation of his white blood cell count to 26,000 and it was suspected the patient has an infection.  The infection was thought to be in the sacral area by the ER staff however they could not turn the patient over to see the wound. Indeed this could not be done on the floor either. Therefore the patient is been admitted for this problem with possible infection in the sacral area as noted. The patient's wound was as follows: * The patient's wound was not infected. The problem for the wound was that he is bleeding inside the subcutaneous tissue and dermis. The darkened areas were coagulated blood which is come to a flattened surface due to his weight. * The area was cleansed with that removal of the upper dermis the patient had 3 pumping blood vessels. The patient's bled considerably from the area due to active pumping vasculature. To the vessels could be closed with compression. Third had to be cauterized. The problem for the patient is that his dressing was much too dry and when they pulled the dressing off it ripped the tissue. This led to bleeding and this was noted profuse. * The patient was managed with hydrogen peroxide wet-to-dry dressings. The patient's white count went down on its own from 26,000 to 13,000 after removal of the damaged and coagulated material in the wound bed. The wound was noted quite granular and satisfactory as noted. The patient had no evidence of an acute infection The nursing [place] requested the patient have an evaluation for his heart. The patient is being seen by cardiology at the request of the institution which stated that he had ventricular dysfunction. This was based on supposed echocardiogram. Actually the patient had an EKG which showed the left ventricular hypertrophy. The patient did have an echo ordered during this admission and a consult was placed in at the request of the ECF. I suspect the patient has is significant afterload due to his weight and this is putting stress and strain on the heart with a patient having left ventricular hypertrophy is to be as a result. Patient may also have findings consistent with right-sided heart failure as well.      Today, patient states that he is doing well. His back is doing better, he denies having any pain. His swelling has significantly improved. His notes that the scrotal edema has resolved. Eating well for the most part. He does note that he is eating more with the increase in diuretics. I have reviewed the patient's past medical history, past surgical history, allergies,medications, social and family history and I have made updates where appropriate. Past Medical History:   Diagnosis Date    Diabetes ([address]     HTN (hypertension)     Insomnia     Morbid obesity (HCC)     Pressure ulcer        No past surgical history on file. Social History     Tobacco Use    Smoking status: Not on file   Substance Use Topics    Alcohol use: Not on file    Drug use: Not on file       No family history on file. Review of Systems        PHYSICAL EXAM:    BP (!) 140/63   Pulse 104   Temp 97.9 °F (36.6 °C)   Resp 18   Wt (!) 443 lb (200.9 kg)       Physical Exam  Vitals signs and nursing note reviewed. Constitutional:       General: He is not in acute distress. Appearance: He is well-developed. HENT:      Head: Normocephalic and atraumatic. Right Ear: Hearing and external ear normal.      Left Ear: Hearing and external ear normal.      Nose: Nose normal.   Eyes:      General:         Right eye: No discharge. Left eye: No discharge. Conjunctiva/sclera: Conjunctivae normal.   Neck:      Musculoskeletal: Normal range of motion and neck supple. Thyroid: No thyromegaly. Trachea: No tracheal deviation. Cardiovascular:      Rate and Rhythm: Normal rate and regular rhythm. Heart sounds: Normal heart sounds. No murmur. No friction rub. No gallop. Pulmonary:      Effort: Pulmonary effort is normal. No respiratory distress. Breath sounds: No wheezing or rales. Chest:      Chest wall: No tenderness. Musculoskeletal:         General: Swelling (severe lymphedema of the LEs bilaterally) present.  No deformity. Lymphadenopathy:      Cervical: No cervical adenopathy. Skin:     General: Skin is warm and dry. Findings: No erythema. Neurological:      Mental Status: He is alert. Motor: No abnormal muscle tone. Coordination: Coordination normal.   Psychiatric:         Behavior: Behavior normal.         Thought Content: Thought content normal.         Judgment: Judgment normal.         ASSESSMENT & PLAN  Filbert Ahumada was seen today for other. Diagnoses and all orders for this visit:    Other specified diabetes mellitus with other specified complication, without long-term current use of insulin ([facility]    Essential hypertension    Primary insomnia    Morbid obesity due to excess calories (HCC)    Stage IV pressure ulcer of sacral region ([facility]    Anasarca    Bilateral leg edema          1. Decubitus Ulcer:  cont wound care, Oxycodone  2. DM2:  cont Levemir, Metformin, SS insulin, Januvia  3. LVH, Edema:  Cont metolazone, Bumex, Lipitor  4. HTN:  cont Metoprolol, Lisinopril  5. MDD:  Cont Zoloft  6.  Insomnia:  cont Daquan Cale

## 2020-03-13 ENCOUNTER — OUTSIDE SERVICES (OUTPATIENT)
Dept: FAMILY MEDICINE CLINIC | Age: 40
End: 2020-03-13
Payer: MEDICARE

## 2020-03-13 PROCEDURE — 99309 SBSQ NF CARE MODERATE MDM 30: CPT | Performed by: FAMILY MEDICINE

## 2020-04-01 ENCOUNTER — OUTSIDE SERVICES (OUTPATIENT)
Dept: FAMILY MEDICINE CLINIC | Age: 40
End: 2020-04-01
Payer: MEDICARE

## 2020-04-01 PROCEDURE — 99309 SBSQ NF CARE MODERATE MDM 30: CPT | Performed by: NURSE PRACTITIONER

## 2020-04-02 NOTE — PROGRESS NOTES
insight and judgement are intact, memory appears intact. He is routinely pleasant and communicates well with staff. Staff take good care of him. Skin: warm and dry, no rash or erythema    ASSESSMENT & PLAN:    1. Essential hypertension  BP is at goal with Metoprolol, Lisinopril. He denies chest pain, pressure, heaviness. No episodes of hypotension or bradycardia. 2. Other specified diabetes mellitus with other specified complication, without long-term current use of insulin (HCC)  Continue Metformin, Januvia, sliding scale Humalog, Detemi  No episodes of hypoglycemia. A1C per routine. Continues to be noncompliant with diet. 3. Decubitus ulcer of sacral region, stage 4 ([facility])  Current with Dr. Yoly Kulkarni, ID. Appreciate Wound Nurse's input. Asking staff to continue to monitor. 4. Morbid obesity due to excess calories ([address]  Continues to gain weight. Appetite good. Noncompliant with diabetic diet. Encourage patient to be oob in wheelchair on a daily basis. He continues to refuse. 5. Other chronic pain  Continue prn Oxycodone and scheduled Tylenol. He reports pain is controlled with this current medication regimen. Patient states his pain is improving as his wound heals. 6. Primary insomnia  Continue home dose of Ambien. Denies uncontrolled insomnia. 7.  Bilateral lower leg edema  3+ pitting edema persists. He declines fluid restriction. Continue Bumex. Continue to monitor kidney function closely. Continues to refuse daily weights. 8.  Major depressive disorder, recurrent. No SI/HI. He smiles and makes eye contact. He engages in conversation. He continues to decline consultation to Psych and Counseling services. Continue current dosage of Zoloft 100 mg po once daily. Will look at decreasing dosage of Zoloft once he is up in his customized wheelchair more often.   Antipsychotic/Antianiety/Hypnotic/Psychotropic/Sedation/Antidepressant medications are continued at this time because discontinuation may result in adverse effects of return or concerning behaviors/symptoms. 9.  Hyperlipidemia  ASA, statin. Disposition:  Assessment and plan as stated above. Follow-up per routine and as warranted. Plan of care reviewed by Dr. Kendy Diaz DO.   Electronically signed by GAYATHRI Luna NP on 4/2/2020 at 4:06 PM

## 2020-05-11 ENCOUNTER — OUTSIDE SERVICES (OUTPATIENT)
Dept: FAMILY MEDICINE CLINIC | Age: 40
End: 2020-05-11
Payer: MEDICARE

## 2020-05-11 PROCEDURE — 99308 SBSQ NF CARE LOW MDM 20: CPT | Performed by: NURSE PRACTITIONER

## 2020-05-11 NOTE — PROGRESS NOTES
[facility] [facility]  Progress Note    NAME: Daksha Landry  DATE: 20  ROOM #: B 30-1  : 1980  REASON FOR VISIT: Other (routine visit)    CODE STATUS: Full Code    History obtained from chart review, the patient and staff. SUBJECTIVE:  HPI: Zhao Ling is a 44 y.o. male. Patient has a PMH significant for:  Past Medical History:   Diagnosis Date    Diabetes ([address]     HTN (hypertension)     Insomnia     Morbid obesity ([address]     Pressure ulcer        Pt seen and examined at bedside today for routine visit and is noted to be in no acute distress. Staff provide appropriate updates; appreciate staff input. I have reviewed patients past medical, surgical, social, and family history and have made updates where appropriate. Allergies and Medications were reviewed through the [facility] EMR.     Patient Active Problem List    Diagnosis Date Noted    Personal history of noncompliance with medical treatment, presenting hazards to health 2020    Anasarca 2020    Diabetes mellitus ([address] 2020    Scrotal edema 2019    Bilateral leg edema 10/09/2019    Major depressive disorder, recurrent episode, moderate ([address] 2019    Colostomy status ([address] 2019    Generalized muscle weakness 2019    Other chronic pain 2019    Morbid obesity ([address]     Diabetes ([address]     Essential hypertension     Primary insomnia     Nursing home resident 2018    Morbid obesity due to excess calories ([address] 2018    Stage IV pressure ulcer of sacral region ([address] 2018       REVIEW OF SYSTEMS  Positive responses are highlighted in bold  Constitutional:  Fever, Chills, Night Sweats, Fatigue, Unexpected changes in weight  HENT:  Ear pain, Tinnitus, Nosebleeds, Trouble swallowing, Hearing loss, Sore throat  Cardiovascular:  Chest Pain, Palpitations, Orthopnea, Paroxysmal Nocturnal Dyspnea  Respiratory:  Cough, Wheezing, Shortness of breath, Chest tightness,

## 2020-06-12 VITALS
SYSTOLIC BLOOD PRESSURE: 105 MMHG | RESPIRATION RATE: 20 BRPM | DIASTOLIC BLOOD PRESSURE: 53 MMHG | HEART RATE: 94 BPM | TEMPERATURE: 98.4 F | WEIGHT: 315 LBS

## 2020-06-13 ENCOUNTER — OUTSIDE SERVICES (OUTPATIENT)
Dept: FAMILY MEDICINE CLINIC | Age: 40
End: 2020-06-13
Payer: MEDICARE

## 2020-06-13 PROCEDURE — 99308 SBSQ NF CARE LOW MDM 20: CPT | Performed by: FAMILY MEDICINE

## 2020-06-13 NOTE — PROGRESS NOTES
Shalini Woodward is a 44 y.o. male that is being seen at [facility] for [address] [place]  1980 6/12/20      HPI:  Patient seen and examined at bedside. History obtain from patient and chart. Patient noted to have O2 sats in the mid 80s. He has chronic hypercapnic respiratory failure. He did receive a breathing treatment and this seemed to help some. Patient states that he is feeling fine. He denies SOB, cough, fever, headache, lightheadedness or other symptoms. I have reviewed the patient's past medical history, past surgical history, allergies,medications, social and family history and I have made updates where appropriate. Past Medical History:   Diagnosis Date    Diabetes ([address]     HTN (hypertension)     Insomnia     Morbid obesity (HCC)     Pressure ulcer        No past surgical history on file. Social History     Tobacco Use    Smoking status: Not on file   Substance Use Topics    Alcohol use: Not on file    Drug use: Not on file       No family history on file. Review of Systems        PHYSICAL EXAM:    BP (!) 105/53   Pulse 94   Temp 98.4 °F (36.9 °C)   Resp 20   Wt (!) 431 lb (195.5 kg)       Physical Exam  Vitals signs and nursing note reviewed. Constitutional:       General: He is not in acute distress. Appearance: He is well-developed. HENT:      Head: Normocephalic and atraumatic. Right Ear: Hearing and external ear normal.      Left Ear: Hearing and external ear normal.      Nose: Nose normal.   Eyes:      General:         Right eye: No discharge. Left eye: No discharge. Conjunctiva/sclera: Conjunctivae normal.   Neck:      Musculoskeletal: Normal range of motion and neck supple. Thyroid: No thyromegaly. Trachea: No tracheal deviation. Cardiovascular:      Rate and Rhythm: Normal rate and regular rhythm. Heart sounds: Normal heart sounds. No murmur. No friction rub. No gallop.     Pulmonary:      Effort:

## 2020-06-15 ENCOUNTER — OUTSIDE SERVICES (OUTPATIENT)
Dept: FAMILY MEDICINE CLINIC | Age: 40
End: 2020-06-15
Payer: MEDICARE

## 2020-06-15 PROCEDURE — 99309 SBSQ NF CARE MODERATE MDM 30: CPT | Performed by: NURSE PRACTITIONER

## 2020-06-15 NOTE — PROGRESS NOTES
[address] [facility]    NAME: Mumtaz Landry  DATE: 6/15/20  ROOM #: B 30-1  : 1980  REASON FOR VISIT: Other (f/u visit for right leg wound)    CODE STATUS: Full Code    History obtained from chart review, the patient and staff. SUBJECTIVE:  HPI: Arsen Luo is a 44 y.o. male. Patient has a PMH significant for:  Past Medical History:   Diagnosis Date    Diabetes ([address]     HTN (hypertension)     Insomnia     Morbid obesity ([address]     Pressure ulcer        Pt seen and examined at bedside today for routine visit and is noted to be in no acute distress. Staff provide appropriate updates; appreciate staff input. I have reviewed patients past medical, surgical, social, and family history and have made updates where appropriate. Allergies and Medications were reviewed through the [facility] EMR.     Patient Active Problem List    Diagnosis Date Noted    Personal history of noncompliance with medical treatment, presenting hazards to health 2020    Anasarca 2020    Diabetes mellitus ([address] 2020    Scrotal edema 2019    Bilateral leg edema 10/09/2019    Major depressive disorder, recurrent episode, moderate ([address] 2019    Colostomy status ([address] 2019    Generalized muscle weakness 2019    Other chronic pain 2019    Morbid obesity ([address]     Diabetes ([address]     Essential hypertension     Primary insomnia     Nursing home resident 2018    Morbid obesity due to excess calories ([address] 2018    Stage IV pressure ulcer of sacral region ([address] 2018       REVIEW OF SYSTEMS  Positive responses are highlighted in bold  Constitutional:  Fever, Chills, Night Sweats, Fatigue, Unexpected changes in weight  HENT:  Ear pain, Tinnitus, Nosebleeds, Trouble swallowing, Hearing loss, Sore throat  Cardiovascular:  Chest Pain, Palpitations, Orthopnea, Paroxysmal Nocturnal Dyspnea  Respiratory:  Cough, Wheezing, Shortness of breath, Chest tightness,

## 2020-06-29 ENCOUNTER — OUTSIDE SERVICES (OUTPATIENT)
Dept: FAMILY MEDICINE CLINIC | Age: 40
End: 2020-06-29
Payer: MEDICARE

## 2020-06-29 PROBLEM — L03.115 CELLULITIS AND ABSCESS OF RIGHT LEG: Status: ACTIVE | Noted: 2020-06-29

## 2020-06-29 PROBLEM — L02.415 CELLULITIS AND ABSCESS OF RIGHT LEG: Status: ACTIVE | Noted: 2020-06-29

## 2020-06-29 PROCEDURE — 99309 SBSQ NF CARE MODERATE MDM 30: CPT | Performed by: NURSE PRACTITIONER

## 2020-06-29 NOTE — PROGRESS NOTES
skin breakdown to scrotum. Musculoskeletal: No joint swelling or gross deformity; bed ridden until his custom wheelchair is available. Neuro:  Alert, 4/5 strength to BUE. BLE with 0/5 strength, normal speech, no focal findings or movement disorder noted  Psych:  Normal affect without evidence of depression or anxiety, insight and judgement are intact, memory appears intact. He is routinely pleasant and communicates well with staff. Staff take good care of him. Skin: warm and dry, no rash or erythema    ASSESSMENT & PLAN:    1. Chronic Cellulitis and abscess of Right lower leg  BLE are edematous and right lower leg continues to have moderate amount of serous drainage. Multiple scabbed and blistered areas. No odor. He completed an IV Vancomycin x 7 days per Physician recommendation. He agrees to [employer], on 2 days and off 1 day, to treat edema and wounds. Continue to monitor. 2.  Acute on chronic bilateral leg edema  Continue Bumex 3 mg po TID. Initiating metolazone, 5 mg po once daily prior to noon dosage of Bumex. Asking for weight tomorrow morning - discussed the importance of weight with patient. Discussed fluid restriction of 2 liters per day - patient states \"I will try\". Patient agrees to plan. CMP on Tuesday. 3.  Acute on chronic scrotal edema  Encourage patient to elevate scrotum throughout the day. Will encourage soto catheter if needed. So far, he is able to void and is continent of urine. Disposition:  Assessment and plan as stated above. Follow-up per routine and as warranted. Plan of care reviewed by Dr. Wilton Marley DO.   Electronically signed by GAYATHRI Mathews NP on 6/29/2020 at 11:05 AM

## 2020-07-06 ENCOUNTER — OUTSIDE SERVICES (OUTPATIENT)
Dept: FAMILY MEDICINE CLINIC | Age: 40
End: 2020-07-06
Payer: MEDICARE

## 2020-07-06 PROCEDURE — 99309 SBSQ NF CARE MODERATE MDM 30: CPT | Performed by: NURSE PRACTITIONER

## 2020-07-06 NOTE — PROGRESS NOTES
[facility] Progress Note    NAME: Jay Landry  DATE: 20  ROOM #: B 30-1  : 1980  REASON FOR VISIT: Other (F/U visit for scrotal edema, leg edema, RLL leg wounds)    CODE STATUS: Full Code    History obtained from chart review, the patient and staff. SUBJECTIVE:  HPI: Mary Grace Ding is a 44 y.o. male. Patient has a PMH significant for:  Past Medical History:   Diagnosis Date    Diabetes ([address]     HTN (hypertension)     Insomnia     Morbid obesity ([address]     Pressure ulcer        Pt seen and examined at bedside today for f/u to leg and scrotal edema, and cellulitis to right lower leg. He is in bed at time of assessment and is noted to be in no acute distress. Staff provide appropriate updates; appreciate staff input. I have reviewed patients past medical, surgical, social, and family history and have made updates where appropriate. Allergies and Medications were reviewed through the [facility] EMR.     Patient Active Problem List    Diagnosis Date Noted    Cellulitis and abscess of right leg 2020    Personal history of noncompliance with medical treatment, presenting hazards to health 2020    Anasarca 2020    Diabetes mellitus ([address] 2020    Scrotal edema 2019    Bilateral leg edema 10/09/2019    Major depressive disorder, recurrent episode, moderate ([address] 2019    Colostomy status ([address] 2019    Generalized muscle weakness 2019    Other chronic pain 2019    Morbid obesity ([address]     Diabetes ([address]     Essential hypertension     Primary insomnia     Nursing home resident 2018    Morbid obesity due to excess calories ([address] 2018    Stage IV pressure ulcer of sacral region ([address] 2018       REVIEW OF SYSTEMS  Positive responses are highlighted in bold  Constitutional:  Fever, Chills, Night Sweats, Fatigue, Unexpected changes in weight  HENT:  Ear pain, Tinnitus, Nosebleeds, Trouble swallowing, Hearing loss, Sore throat  Cardiovascular:  Chest Pain, Palpitations, Orthopnea, Paroxysmal Nocturnal Dyspnea  Respiratory:  Cough, Wheezing, Shortness of breath, Chest tightness, Apnea  Gastrointestinal:  Nausea, Vomiting, Diarrhea, Constipation, Heartburn, Blood in stool  Genitourinary:  Difficulty or painful urination, Flank pain, Change in frequency, Urgency  Skin:  Color change, Rash, Itching, Wound  Musculoskeletal:  Joint pain, Back pain, Gait problems, Joint swelling, Myalgias  Neurological:  Dizziness, Headaches, Presyncope, Numbness, Seizures, Tremors  Endocrine:  Heat Intolerance, Cold Intolerance, Polydipsia, Polyphagia, Polyuria    OBJECTIVE:  PHYSICAL EXAM:  VS:  113/64, 97.4, 87, 18, 159 blood glucose, 91% on supplemental oxygen  Weight in pounds:  437.0 (was 444.6, 449.6, 454.2, 463.5)  Pain: Denies    VS reviewed. General Appearance: morbidly obese, debilitated, in no acute distress  Head: normocephalic and atraumatic  Eyes: conjunctivae and eye lids without erythema  ENT: external ear and ear canal normal bilaterally, nose without deformity, nasal mucosa and turbinates normal without polyps, oropharynx normal, dentition is normal for age, no lip or gum lesions noted  Neck: supple and non-tender without mass, no thyromegaly or thyroid nodules, no cervical lymphadenopathy  Pulmonary/Chest: Faint, bilateral anterior wheeze is cleared with coughing; now clear but diminished to auscultation bilaterally- no wheezes, rales or rhonchi, normal air movement, no respiratory distress or retractions, requires no supplemental oxygen; he denies cough and sob at rest.  Cardiovascular: normal rate, regular rhythm, normal S1 and S2, no murmurs, rubs, clicks, or gallops, distal pulses intact  Abdomen: soft, non-tender, non-distended, bowel sounds physiologic,  no rebound or guarding, no masses or hernias noted. Liver and spleen without enlargement. Extremities: no cyanosis, clubbing. Chronic, 3+ pitting edema to BLE.   Right leg wound as described below. Musculoskeletal: No joint swelling or gross deformity; bed ridden until his custom wheelchair is available. Neuro:  Alert, 4/5 strength to BUE. BLE with 0/5 strength, normal speech, no focal findings or movement disorder noted  Psych:  Normal affect without evidence of depression or anxiety, insight and judgement are intact, memory appears intact. He is routinely pleasant and communicates well with staff. Staff take good care of him. Skin: warm and dry, no rash or erythema    ASSESSMENT & PLAN:    1. Chronic Cellulitis and abscess of Right lower leg  Cellulitis is greatly improved. Multiple scabbed areas are healing. No blisters noted to BLE. He completed an IV Vancomycin x 7 days per Physician recommendation. He has been compliant with [employer], on 2 days and off 1 day, to treat edema and wounds. Continue to monitor. 2.  Acute on chronic bilateral leg edema  Continue Bumex 3 mg po TID. Metolazone, 5 mg po once daily prior to noon dosage of Bumex was initiated on 6/29 and d/c on 7/5. Will check BMP again today. CO2 was gradually rising. Will consider scheduling metolazone several times per week pending labs today. Continue daily weights. He reports being compliant with fluid restriction 2 liters/day. Appreciate his compliance. 3.  Acute on chronic scrotal edema  Encourage patient to elevate scrotum throughout the day. Patient reports his scrotal edema is gradually continuing to resolve. Will encourage soto catheter if needed. So far, he is able to void and is continent of urine. Disposition:  Assessment and plan as stated above. Follow-up per routine and as warranted. Plan of care reviewed by Dr. Claude Grizzle, DO.   Electronically signed by GAYATHRI Hernández NP on 7/6/2020 at 3:23 PM

## 2020-07-20 ENCOUNTER — OUTSIDE SERVICES (OUTPATIENT)
Dept: FAMILY MEDICINE CLINIC | Age: 40
End: 2020-07-20
Payer: MEDICARE

## 2020-07-20 PROCEDURE — 99309 SBSQ NF CARE MODERATE MDM 30: CPT | Performed by: NURSE PRACTITIONER

## 2020-07-20 NOTE — PROGRESS NOTES
[address] [facility] & Progress Note    NAME: Louann Blizzard Sprague  DATE: 20  ROOM #: B 30-1  : 1980  REASON FOR VISIT: Other (routine visit)    CODE STATUS: Full Code    History obtained from chart review, the patient and staff. SUBJECTIVE:  HPI: Michael Barbour is a 36 y.o. male. Patient has a PMH significant for:  Past Medical History:   Diagnosis Date    Diabetes ([address]     HTN (hypertension)     Insomnia     Morbid obesity ([address]     Pressure ulcer      Pt seen and examined at bedside today for routine visit and is noted to be in no acute distress. He reports feeling well and has no questions or concerns. Staff provide appropriate updates; appreciate staff input. I have reviewed patients past medical, surgical, social, and family history and have made updates where appropriate. Allergies and Medications were reviewed through the [facility] EMR.     Patient Active Problem List    Diagnosis Date Noted    Cellulitis and abscess of right leg 2020    Personal history of noncompliance with medical treatment, presenting hazards to health 2020    Anasarca 2020    Diabetes mellitus ([address] 2020    Scrotal edema 2019    Bilateral leg edema 10/09/2019    Major depressive disorder, recurrent episode, moderate ([address] 2019    Colostomy status ([address] 2019    Generalized muscle weakness 2019    Other chronic pain 2019    Morbid obesity ([address]     Diabetes ([address]     Essential hypertension     Primary insomnia     Nursing home resident 2018    Morbid obesity due to excess calories ([address] 2018    Stage IV pressure ulcer of sacral region ([address] 2018     REVIEW OF SYSTEMS  Positive responses are highlighted in bold  Constitutional:  Fever, Chills, Night Sweats, Fatigue, Unexpected changes in weight  HENT:  Ear pain, Tinnitus, Nosebleeds, Trouble swallowing, Hearing loss, Sore throat  Cardiovascular:  Chest Pain, Palpitations, Orthopnea, Paroxysmal Nocturnal Dyspnea  Respiratory:  Cough, Wheezing, Shortness of breath, Chest tightness, Apnea  Gastrointestinal:  Nausea, Vomiting, Diarrhea, Constipation, Heartburn, Blood in stool  Genitourinary:  Difficulty or painful urination, Flank pain, Change in frequency, Urgency  Skin:  Color change, Rash, Itching, Wound  Musculoskeletal:  Joint pain, Back pain, Gait problems, Joint swelling, Myalgias  Neurological:  Dizziness, Headaches, Presyncope, Numbness, Seizures, Tremors  Endocrine:  Heat Intolerance, Cold Intolerance, Polydipsia, Polyphagia, Polyuria    OBJECTIVE:  PHYSICAL EXAM:  VS:  121/66, 97.3, 88, 20, 139 blood glucose, 90% on room air  Weight in pounds:  447.9 (was 434.1, 427.7, 443.5, 435.4)  Pain: Denies    VS reviewed. General Appearance: morbidly obese, debilitated, in no acute distress  Head: normocephalic and atraumatic  Eyes: conjunctivae and eye lids without erythema  ENT: external ear and ear canal normal bilaterally, nose without deformity, nasal mucosa and turbinates normal without polyps, oropharynx normal, dentition is normal for age, no lip or gum lesions noted  Neck: supple and non-tender without mass, no thyromegaly or thyroid nodules, no cervical lymphadenopathy  Pulmonary/Chest: clear but diminished to auscultation bilaterally- no wheezes, rales or rhonchi, normal air movement, no respiratory distress or retractions, requires supplemental oxygen at time of assessment  Cardiovascular: normal rate, regular rhythm, normal S1 and S2, no murmurs, rubs, clicks, or gallops, distal pulses intact  Abdomen: soft, non-tender, non-distended, bowel sounds physiologic,  no rebound or guarding, no masses or hernias noted. Liver and spleen without enlargement. Extremities: no cyanosis, clubbing. Chronic, 3+ pitting edema to BLE. Musculoskeletal: No joint swelling or gross deformity; bed ridden until his custom wheelchair is available. Neuro:  Alert, 4/5 strength to BUE.   BLE with 0/5 strength, normal speech, no focal findings or movement disorder noted  Psych:  Normal affect without evidence of depression or anxiety, insight and judgement are intact, memory appears intact. He is routinely pleasant and communicates well with staff. Staff take good care of him. Skin: warm and dry, no rash or erythema; BLE are wrapped with [employer]    ASSESSMENT & PLAN:    1. Essential hypertension  BP is at goal with Metoprolol. Will stop Lisinopril for high potassium levels. He denies chest pain, pressure, heaviness. No SOB. No episodes of hypotension or bradycardia. 2. Other specified diabetes mellitus with other specified complication, with long-term current use of insulin (HCC)  Continue Metformin, Januvia, sliding scale Humalog, Detemi  No episodes of hypoglycemia. A1C per routine. 6/12 A1C is 6.3/134  Continues to be noncompliant with diet. 3. Decubitus ulcer of sacral region, stage 4 (HCC)  Current with Dr. Jorden German, ID. Appreciate Wound Nurse's input. [place] Pietro reports Dr. Jorden German saw him last week and is pleased that the wound is healing. Asking staff to continue to monitor. 4. Morbid obesity due to excess calories ([address]  Continues to gain weight. Appetite good. Noncompliant with diabetic diet. Encourage patient to be oob in wheelchair on a daily basis. He continues to refuse. 5. Other chronic pain  Continue prn Oxycodone ER, prn oxycodone and and scheduled Tylenol. He reports pain is adequately controlled with this current medication regimen. Patient states his pain is improving as his wound heals. 6. Primary insomnia  Continue home dose of Ambien. Denies uncontrolled insomnia. 7.  Bilateral lower leg edema  3+ pitting edema persists. He is often non-compliant with fluid restriction, wrapping BLE and elevating scrotum. Continue Bumex. Continue to monitor kidney function closely. Has remained stable. Baseline Creat is 1.4. Continues to encourage daily weights.     8.  Major depressive disorder, recurrent. No SI/HI. He smiles and makes eye contact. He engages in conversation. He continues to decline consultation to Psych and Counseling services. Continue current dosage of Zoloft 100 mg po once daily. Will look at decreasing dosage of Zoloft once he is up in his customized wheelchair more often. Antipsychotic/Antianiety/Hypnotic/Psychotropic/Sedation/Antidepressant medications are continued at this time because discontinuation may result in adverse effects of return or concerning behaviors/symptoms. 9.  Hyperlipidemia  ASA, statin. Routine lab monitoring. 10.  Hypocalcemia  Continue supplemental calcium and vitamin D. Routine lab monitoring. 11.  Attention to Colostomy  Diverting colostomy continues to drain brown liquid stool. Staff report no concerns. Disposition:  Assessment and plan as stated above. Follow-up per routine and as warranted. Plan of care reviewed by Dr. Trinidad Persaud DO.   Electronically signed by GAYATHRI Chester NP on 7/20/2020 at 6:54 PM

## 2020-08-17 ENCOUNTER — OUTSIDE SERVICES (OUTPATIENT)
Dept: FAMILY MEDICINE CLINIC | Age: 40
End: 2020-08-17
Payer: MEDICARE

## 2020-08-17 PROCEDURE — 99309 SBSQ NF CARE MODERATE MDM 30: CPT | Performed by: NURSE PRACTITIONER

## 2020-08-17 NOTE — PROGRESS NOTES
or movement disorder noted  Psych:  Normal affect without evidence of depression or anxiety, insight and judgement are intact, memory appears intact. He is routinely pleasant and communicates well with staff. Staff take good care of him. Skin: warm and dry, no rash or erythema; BLE are wrapped with Unna Boots    ASSESSMENT & PLAN:    1. Essential hypertension  BP is at goal with Metoprolol. Lisinopril was stopped for high potassium levels - has done well off Lisinopril. He denies chest pain, pressure, heaviness. No SOB. No episodes of hypotension or bradycardia. 2. Other specified diabetes mellitus with other specified complication, with long-term current use of insulin (HCC)  Continue Metformin, Januvia, sliding scale Humalog, Detemi  No episodes of hypoglycemia. A1C per routine. 6/12 A1C is 6.3/134  Continues to be noncompliant with diet. 3. Decubitus ulcer of sacral region, stage 4 (HCC)  Current with Dr. Daysi Paz, ID. Appreciate Wound Nurse's input. Bethany Colin reports Dr. Daysi Paz saw him in August and is pleased that the wound is healing. Asking staff to continue to monitor. 4. Morbid obesity due to excess calories ([address]  Continues to gain weight. Appetite good. Noncompliant with diabetic diet. Encourage patient to be oob in wheelchair on a daily basis. He continues to refuse. 5. Other chronic pain  Continue prn Oxycodone ER, prn oxycodone and and scheduled Tylenol. He reports pain is adequately controlled with this current medication regimen. Patient states his pain is improving as his wound heals. 6. Primary insomnia  Continue home dose of Ambien. Denies problems sleeping at night. 7.  Bilateral lower leg edema  3+ pitting edema persists. He is often non-compliant with fluid restriction, wrapping BLE and elevating scrotum. Continue Bumex, Metolazone. Continue to monitor kidney function closely. Has remained stable. Baseline Creat is 1.4.   Continues to encourage routine weights. BMP on 8/17 is pending. 8.  Major depressive disorder, recurrent. No SI/HI. He smiles and makes eye contact. He engages in conversation. He continues to decline consultation to Psych and Counseling services. Continue current dosage of Zoloft 100 mg po once daily. Will look at decreasing dosage of Zoloft once he is up in his customized wheelchair more often. Antipsychotic/Antianiety/Hypnotic/Psychotropic/Sedation/Antidepressant medications are continued at this time because discontinuation may result in adverse effects of return or concerning behaviors/symptoms. 9.  Hyperlipidemia  ASA, statin. Routine lab monitoring. 10.  Hypocalcemia  Continue supplemental calcium and vitamin D. Routine lab monitoring. 11.  Attention to Colostomy  Diverting colostomy continues to drain brown liquid stool. Staff report no concerns. Disposition:  Assessment and plan as stated above. Follow-up per routine and as warranted. Plan of care reviewed by Dr. Leida Kinney DO.   Electronically signed by GAYATHRI Johnson NP on 8/17/2020 at 4:31 PM

## 2020-09-15 ENCOUNTER — OUTSIDE SERVICES (OUTPATIENT)
Dept: FAMILY MEDICINE CLINIC | Age: 40
End: 2020-09-15
Payer: MEDICARE

## 2020-09-15 PROBLEM — N17.9 ACUTE KIDNEY FAILURE, UNSPECIFIED (HCC): Status: ACTIVE | Noted: 2020-09-15

## 2020-09-15 PROBLEM — Z97.8 FOLEY CATHETER IN PLACE: Status: ACTIVE | Noted: 2020-09-15

## 2020-09-15 PROBLEM — Z49.01 ENCOUNTER FOR DIALYSIS CATHETER CARE (HCC): Status: ACTIVE | Noted: 2020-09-15

## 2020-09-15 PROCEDURE — 99309 SBSQ NF CARE MODERATE MDM 30: CPT | Performed by: NURSE PRACTITIONER

## 2020-09-15 PROCEDURE — 99356 PR PROLONGED SVC I/P OR OBS SETTING 1ST HOUR: CPT | Performed by: NURSE PRACTITIONER

## 2020-09-15 NOTE — PROGRESS NOTES
[address] Progress Note    NAME: [address] [place]  DATE: 9/15/20  ROOM #: [id number]  : 1980  REASON FOR VISIT: Other (Routine visit to readmit patient to [facility].)    CODE STATUS: Full Code    History obtained from chart review, the patient and staff. SUBJECTIVE:  HPI: Alejandra Rubio is a 36 y.o. male. Patient has a PMH significant for:  Past Medical History:   Diagnosis Date    Diabetes ([facility]     HTN (hypertension)     Insomnia     Morbid obesity ([facility]     Pressure ulcer        He was admitted to [facility] from 9/10 to  for acute renal failure. Hospital course is as follows:    Pt is a 66-year-old male with past medical history of hypertension, DM type II, hyperlipidemia, decubitus to sacral ulcer, bedbound, nursing home resident who was brought to the emergency room due to urinary retention and decreased urine output. BUN/creatinine elevated at 68/5.13, was also noted to be hypoglycemic with blood glucose of 34, UA noted to be abnormal but urine culture negative, Desir catheter was placed. Cxr was showing patching infiltrates that is not changed from prior, and procal was elevated. Pt was started on IV antibx originally, but ID recommended stopping and daily dressing change of coccyx wound. Nephrology were following for pt's NANCY. Desir catheter placed due to urinary retention, and pt has a C tunnelled HD catheter. Nephrology did HD on pt while in hospital, and are okay with pt discharged with IHD on MWF after discharge. Pt pending dc to SNF. Pt seen and examined at bedside today and is noted to be in no acute distress. He reports feeling well and has no questions or concerns. Staff provide appropriate updates; appreciate staff input. I have reviewed patients past medical, surgical, social, and family history and have made updates where appropriate. Allergies and Medications were reviewed through the [facility] EMR.     Patient Active Problem List    Diagnosis Date Noted    Acute kidney failure, unspecified ([facility] 09/15/2020    Cellulitis and abscess of right leg 06/29/2020    Personal history of noncompliance with medical treatment, presenting hazards to health 01/14/2020    Anasarca 01/14/2020    Diabetes mellitus ([address] 01/13/2020    Scrotal edema 11/12/2019    Bilateral leg edema 10/09/2019    Major depressive disorder, recurrent episode, moderate ([address] 08/14/2019    Colostomy status ([address] 07/25/2019    Generalized muscle weakness 07/25/2019    Other chronic pain 05/29/2019    Morbid obesity ([address]     Diabetes ([address]     Essential hypertension     Primary insomnia     Nursing home resident 12/29/2018    Morbid obesity due to excess calories ([address] 11/08/2018    Stage IV pressure ulcer of sacral region ([address] 11/08/2018     REVIEW OF SYSTEMS  Positive responses are highlighted in bold  Constitutional:  Fever, Chills, Night Sweats, Fatigue, Unexpected changes in weight  HENT:  Ear pain, Tinnitus, Nosebleeds, Trouble swallowing, Hearing loss, Sore throat  Cardiovascular:  Chest Pain, Palpitations, Orthopnea, Paroxysmal Nocturnal Dyspnea  Respiratory:  Cough, Wheezing, Shortness of breath, Chest tightness, Apnea  Gastrointestinal:  Nausea, Vomiting, Diarrhea, Constipation, Heartburn, Blood in stool  Genitourinary:  Difficulty or painful urination, Flank pain, Change in frequency, Urgency  Skin:  Color change, Rash, Itching, Wound  Musculoskeletal:  Joint pain, Back pain, Gait problems, Joint swelling, Myalgias  Neurological:  Dizziness, Headaches, Presyncope, Numbness, Seizures, Tremors  Endocrine:  Heat Intolerance, Cold Intolerance, Polydipsia, Polyphagia, Polyuria    LABS:  9/14/20:  Na 134, K 4.6, chloride 95, CO2 36, Anion Gap 3, glucose 118, BUN 68, Creat 4.86, GFR 13.  9/14/20:  WBC 8.3, Hgb 7.8, platelets 373. OBJECTIVE:  PHYSICAL EXAM:  VS:  109/55, 98.5, 78, 20, 178 blood glucose, 91% on 2 liters oxygen via nasal cannula.   Weight in pounds:  473.0 (was 448.1, 447.9, 434.1)  Pain: Denies    VS reviewed. General Appearance: morbidly obese, debilitated, in no acute distress  Head: normocephalic and atraumatic  Eyes: conjunctivae and eye lids without erythema  ENT: external ear and ear canal normal bilaterally, nose without deformity, nasal mucosa and turbinates normal without polyps, oropharynx normal, dentition is normal for age, no lip or gum lesions noted  Neck: supple and non-tender without mass, no thyromegaly or thyroid nodules, no cervical lymphadenopathy  Pulmonary/Chest: clear but diminished to auscultation bilaterally- no wheezes, rales or rhonchi, normal air movement, no respiratory distress or retractions, requires supplemental oxygen at time of assessment  Cardiovascular: normal rate, regular rhythm, normal S1 and S2, no murmurs, rubs, clicks, or gallops, distal pulses intact. Right chest dialysis catheter is in place. Abdomen: soft, non-tender, non-distended, bowel sounds physiologic,  no rebound or guarding, no masses or hernias noted. Liver and spleen without enlargement. Genitourinary:  Indwelling soto catheter drains clear yellow urine. Extremities: no cyanosis, clubbing. Chronic, 3+ pitting edema to BLE. Musculoskeletal: No joint swelling or gross deformity; He is bed ridden. Neuro:  Alert and oriented, 4/5 strength to BUE. BLE with 0/5 strength, normal speech, no focal findings or movement disorder noted  Psych:  Normal affect without evidence of depression or anxiety, insight and judgement are intact, memory appears intact. He is routinely pleasant and communicates well with staff. Staff take good care of him. Skin: warm and dry, no rash or erythema; BLE are wrapped with Unna Boots    ASSESSMENT & PLAN:    1. Acute kidney failure  Continue hemodialysis M-W-F. F/U with Dr. Tawana Houston as indicated. 2.  Urinary retention  OK to maintain indwelling soto catheter for urinary retention due to edema.     3. Decubitus ulcer of sacral region, stage 4 ([address]  Continue Dakins dressings. F/U with Dr. Blanca Colin as indicated. Appreciate Wound Nurse's input. Asking staff to continue to monitor. Continue MVI and Zinc supplements. 4.  COPD exacerbation  Treated with Solumedrol and Prednisone 40 mg po once daily x 4 days. Stable at time of assessment. 5. Essential hypertension  BP is at goal with Metoprolol 50 mg po every 8 hours. Lisinopril was stopped for high potassium levels - has done well off Lisinopril. He denies chest pain, pressure, heaviness. No SOB. No episodes of hypotension or bradycardia. 6. Other specified diabetes mellitus with other specified complication, with long-term current use of insulin (HCC)  Metformin and Januvia were stopped. Continue sliding scale Humalog, Detemi  No episodes of hypoglycemia. A1C per routine. 6/12 A1C is 6.3/134  Continues to be noncompliant with diet. 7. Other chronic pain  Continue prn Oxycodone and prn Tylenol. He reports wound pain with dressing changes. 8. Primary insomnia  Continue home dose of Ambien. Denies problems sleeping at night. 9.  Bilateral lower leg edema  3+ pitting edema persists. He is often non-compliant with fluid restriction, wrapping BLE and elevating scrotum, in-house weights. Routine weights with Dialysis. 10.  Major depressive disorder, recurrent. No SI/HI. He smiles and makes eye contact. He engages in conversation. He continues to decline consultation to Psych and Counseling services. Continue current dosage of Zoloft 100 mg po once daily. Will look at decreasing dosage of Zoloft once he is up in his customized wheelchair more often. Antipsychotic/Antianiety/Hypnotic/Psychotropic/Sedation/Antidepressant medications are continued at this time because discontinuation may result in adverse effects of return or concerning behaviors/symptoms. 11.  Hyperlipidemia  ASA, statin. Routine lab monitoring. 12.  Hypocalcemia  Continue supplemental calcium and vitamin D.   Routine lab monitoring. 13.  Attention to Colostomy  Diverting colostomy continues to drain brown liquid stool. Staff report no concerns. 14. Morbid obesity due to excess calories ([address]  Continues to gain weight. Appetite good. Noncompliant with diabetic diet. Encourage patient to be oob in wheelchair on a daily basis. He continues to refuse. Disposition:  Assessment and plan as stated above. Follow-up per routine and as warranted. Total time:  40 minutes    Plan of care reviewed by Dr. Kailey Ramos DO.   Electronically signed by GAYATHRI Méndez NP on 9/15/2020 at 3:31 PM

## 2020-09-17 NOTE — PROGRESS NOTES
Sil Hyman is a 36 y.o. male that is being seen at [facility] for Other (NANCY)      Jocelyn Guadarrama Gris  1980 9/18/20      HPI:  Patient seen and examined at bedside. History obtain from patient and chart. Patient was recently admitted to [facility] for treatment of ESRD. Per DC summary:    Pt is a 44-year-old male with past medical history of hypertension, DM type II,  hyperlipidemia, decubitus to sacral ulcer, bedbound, nursing home resident who was brought  to the emergency room due to urinary retention and decreased urine output. BUN/creatinine  elevated at 68/5.13, was also noted to be hypoglycemic with blood glucose of 34, UA noted  to be abnormal but urine culture negative, Desir catheter was placed. Cxr was showing  patching infiltrates that is not changed from prior, and procal was elevated. Pt was  started on IV antibx originally, but ID recommended stopping and daily dressing change of  coccyx wound. Nephrology were following for pt's NANCY. Desir catheter placed due to urinary  retention, and pt has a [facility] tunnelled HD catheter. Nephrology did HD on pt while in  hospital, and are okay with pt discharged with IHD on MWF after discharge. Pt pending dc  to SNF. Patient has been refusing to go to HD. The reason for this is that HD will not allow him to lay in a bed during HD. Jeremy Hunter CNP has contacted the HD center and they have stated that they can make accomodations for him, but he cannot be in a bed for HD. Patient is refusing this. Noel Doshi has offered to give him an extra dose of pain medication, patient has continued to decline this. His plan is that if he feels bad, he will go to the ER. I spoke with patient again today regarding this situation. He is still refusing as above. I discussed with him that failure to do dialysis can have potentially fatal consequences. He states that he is aware of this.   I advised him that we need to monitor closely for any symptoms including N/V, fatigue, Eyes:      General:         Right eye: No discharge. Left eye: No discharge. Conjunctiva/sclera: Conjunctivae normal.   Neck:      Musculoskeletal: Normal range of motion and neck supple. Thyroid: No thyromegaly. Trachea: No tracheal deviation. Cardiovascular:      Rate and Rhythm: Normal rate and regular rhythm. Heart sounds: Normal heart sounds. No murmur. No friction rub. No gallop. Pulmonary:      Effort: Pulmonary effort is normal. No respiratory distress. Breath sounds: No wheezing or rales. Chest:      Chest wall: No tenderness. Musculoskeletal:         General: Swelling (severe lymphedema of the LEs bilaterally) present. No deformity. Lymphadenopathy:      Cervical: No cervical adenopathy. Skin:     General: Skin is warm and dry. Findings: No erythema. Neurological:      Mental Status: He is alert. Motor: No abnormal muscle tone. Coordination: Coordination normal.   Psychiatric:         Behavior: Behavior normal.         Thought Content: Thought content normal.         Judgment: Judgment normal.         ASSESSMENT & PLAN  Louann Blizzard was seen today for other. Diagnoses and all orders for this visit:    Other specified diabetes mellitus with other specified complication, without long-term current use of insulin ([address]    Essential hypertension    Primary insomnia    Morbid obesity due to excess calories ([address]    Stage IV pressure ulcer of sacral region [facility])    Acute renal failure, unspecified acute renal failure type ([address]          1. Hypoxia:  Continue breathing treatments, will also obtain CXR. 2. Decubitus Ulcer:  cont wound care, Oxycodone  3. DM2:  cont Levemir, Metformin, SS insulin, Linagliptin  4. HTN:  cont Metoprolol,   5.  Insomnia:  cont Jc Rosales

## 2020-09-18 ENCOUNTER — OUTSIDE SERVICES (OUTPATIENT)
Dept: FAMILY MEDICINE CLINIC | Age: 40
End: 2020-09-18
Payer: MEDICARE

## 2020-09-18 VITALS
TEMPERATURE: 97.7 F | HEART RATE: 91 BPM | RESPIRATION RATE: 18 BRPM | DIASTOLIC BLOOD PRESSURE: 76 MMHG | WEIGHT: 315 LBS | SYSTOLIC BLOOD PRESSURE: 126 MMHG

## 2020-09-18 PROCEDURE — 99308 SBSQ NF CARE LOW MDM 20: CPT | Performed by: FAMILY MEDICINE

## 2020-09-18 ASSESSMENT — ENCOUNTER SYMPTOMS
NAUSEA: 0
COUGH: 0
SHORTNESS OF BREATH: 0
VOMITING: 0
BACK PAIN: 0
CONSTIPATION: 0
ABDOMINAL PAIN: 0
WHEEZING: 0
SORE THROAT: 0
DIARRHEA: 0

## 2020-09-28 ENCOUNTER — CLINICAL DOCUMENTATION (OUTPATIENT)
Dept: FAMILY MEDICINE CLINIC | Age: 40
End: 2020-09-28

## 2020-09-28 NOTE — PROGRESS NOTES
Plan was for patient to begin hemodialysis at [facility] outpatient dialysis clinic upon return to [facility] from [facility]. Patient continues to refuse hemodialysis because outpatient hemodialysis can't accommodate his request for a bed during dialysis and for staff to manually lift him from a cart to a bed. Even if dialysis would accommodate his request for a bed, they do not have staff to transfer from cart to bed. He weighs approximately 475 pounds and is physically unable to assist with his transfers. It is likely that ambulance transport and ECF staff also could not manually transfer him. He continues to decline transfer to an ECF in the [place] area with in-house hemodialysis. He states that he doesn't want to be away from his family, although he may only need a 30 day stay at the [facility]. He wants to stay in-patient at [facility] where he can stay in his bed and receive hemodialysis. He was educated but has difficulty understanding that he does not quality for hospitalizations and insurance will not pay for him to be there 30 days. His plan is to stay Full Code and to be transferred to the ER if his physical condition declines. Discussed with staff. Appreciate their care and support for Adarsh Westbrook. They update that it takes 5 people to turn him onto his side so nursing can attend to his coccyx wounds, and he does not tolerate this well. Adarsh Westbrook acknowledges that he would benefit, and may lose as much as 60 pounds of fluid weight, from hemodialysis. He continues to decline. Will continue routine lab monitoring.

## 2020-10-05 ENCOUNTER — OUTSIDE SERVICES (OUTPATIENT)
Dept: FAMILY MEDICINE CLINIC | Age: 40
End: 2020-10-05
Payer: MEDICARE

## 2020-10-05 PROCEDURE — 99309 SBSQ NF CARE MODERATE MDM 30: CPT | Performed by: NURSE PRACTITIONER

## 2020-10-06 NOTE — PROGRESS NOTES
[address]    NAME: Stefany Landry  DATE: 10/5/20  ROOM #: B 30-1  : 1980  REASON FOR VISIT: Other (Acute visit to assess medical stability on readmission to [facility])    CODE STATUS: Full Code    History obtained from chart review, the patient and staff. SUBJECTIVE:  HPI: Baldemar Ballard is a 36 y.o. male. Patient has a PMH significant for:  Past Medical History:   Diagnosis Date    Diabetes ([facility] [address]     HTN (hypertension)     Insomnia     Morbid obesity ([facility] [address]     Pressure ulcer        He was admitted to [facility] from  to 10/2 for acute renal failure. Hospital course is as follows:    Hospital Course -   Discharge Diagnoses     (1) NANCY (acute kidney injury) Status: Acute Home Going Treatment Plan: Patient is being dialyzed intermittently by nephrology consult. There is probably and likely a component of end-stage renal disease. (2) Morbid obesity with BMI of 50.0-59.9, adult Status: Acute Home Going Treatment Plan: Weight loss through dietary caloric restrictions and exercises as tolerated advised     (3) Hyperkalemia, diminished renal excretion Status: Acute Home Going Treatment Plan: Resolved with hemodialysis. (4) Multiple open wounds of hip Status: Acute Home Going Treatment Plan: ID consult and wound care following with further recommendations. (5) Sacral decubitus ulcer, stage IV Status: Acute Home Going Treatment Plan: Wound care following as well as ID consult for further recommendations with dressing changes.      (6) Diabetic foot ulcer associated with type 2 diabetes mellitus Status: Chronic Qualifiers: Diabetic foot ulcer location: heel Laterality: unspecified laterality Non-pressure ulcer stage: unspecified non-pressure ulcer stage Qualified Code(s): E11.621 - Type 2 diabetes mellitus with foot ulcer; L97.409 - Non-pressure chronic ulcer of unspecified heel and midfoot with unspecified severity Home Going Treatment Plan: Patient receiving IV antibiotics and ID discharge orders and instructions were also placed. Patient has had improvement in his breathing after hemodialysis back-to-back and is scheduled for more hemodialysis upon discharge. He has been cleared by all consultants for discharge and he has been discharged in a stable state. Pt seen and examined at bedside today and is noted to be in no acute distress. He reports feeling well and has no questions or concerns. [facility] agrees to [facility] dialysis today which is a great improvement from his prior refusals. Staff provide appropriate updates; appreciate staff input. I have reviewed patients past medical, surgical, social, and family history and have made updates where appropriate. Allergies and Medications were reviewed through the [facility] EMR.     Patient Active Problem List    Diagnosis Date Noted    Acute kidney failure, unspecified ([address] 09/15/2020    Desir catheter in place 09/15/2020    Encounter for dialysis catheter care ([address] 09/15/2020    Cellulitis and abscess of right leg 06/29/2020    Personal history of noncompliance with medical treatment, presenting hazards to health 01/14/2020    Anasarca 01/14/2020    Diabetes mellitus ([address] 01/13/2020    Scrotal edema 11/12/2019    Bilateral leg edema 10/09/2019    Major depressive disorder, recurrent episode, moderate ([address] 08/14/2019    Colostomy status ([address] 07/25/2019    Generalized muscle weakness 07/25/2019    Other chronic pain 05/29/2019    Morbid obesity ([address]     Diabetes ([address]     Essential hypertension     Primary insomnia     Nursing home resident 12/29/2018    Morbid obesity due to excess calories ([address] 11/08/2018    Stage IV pressure ulcer of sacral region ([address] 11/08/2018     REVIEW OF SYSTEMS  Positive responses are highlighted in bold  Constitutional:  Fever, Chills, Night Sweats, Fatigue, Unexpected changes in weight  HENT:  Ear pain, Tinnitus, Nosebleeds, Trouble swallowing, Hearing loss, Sore throat  Cardiovascular: Chest Pain, Palpitations, Orthopnea, Paroxysmal Nocturnal Dyspnea  Respiratory:  Cough, Wheezing, Shortness of breath, Chest tightness, Apnea  Gastrointestinal:  Nausea, Vomiting, Diarrhea, Constipation, Heartburn, Blood in stool  Genitourinary:  Difficulty or painful urination, Flank pain, Change in frequency, Urgency  Skin:  Color change, Rash, Itching, Wound  Musculoskeletal:  Joint pain, Back pain, Gait problems, Joint swelling, Myalgias  Neurological:  Dizziness, Headaches, Presyncope, Numbness, Seizures, Tremors  Endocrine:  Heat Intolerance, Cold Intolerance, Polydipsia, Polyphagia, Polyuria    OBJECTIVE:  PHYSICAL EXAM:  VS:  132/72, 98.7, 82, 18, 283 blood glucose, 93% on supplemental oxygen. Weight in pounds:  He refuses weights after admission. Pt will be weighed with dialysis. Was 473.0, 448.1, 447.9, 434.1)  Pain: Denies    VS reviewed. General Appearance: morbidly obese, debilitated, in no acute distress; bed ridden. Head: normocephalic and atraumatic  Eyes: conjunctivae and eye lids without erythema  ENT: external ear and ear canal normal bilaterally, nose without deformity, nasal mucosa and turbinates normal without polyps, oropharynx normal, dentition is normal for age, no lip or gum lesions noted  Neck: supple and non-tender without mass, no thyromegaly or thyroid nodules, no cervical lymphadenopathy  Pulmonary/Chest: clear but diminished to auscultation bilaterally- no wheezes, rales or rhonchi, normal air movement, no respiratory distress or retractions, requires supplemental oxygen at time of assessment  Cardiovascular: normal rate, regular rhythm, normal S1 and S2, no murmurs, rubs, clicks, or gallops, distal pulses intact. Right chest dialysis catheter is in place. Abdomen: obese abdomen is soft, non-tender, non-distended, bowel sounds physiologic,  no rebound or guarding, no masses or hernias noted. Liver and spleen without enlargement.    Genitourinary:  Indwelling soto catheter drains clear yellow urine. Extremities: no cyanosis, clubbing. Chronic, 3+ pitting edema to BLE. Musculoskeletal: No joint swelling or gross deformity; He is bed ridden. Neuro:  Alert and oriented, 4/5 strength to BUE. BLE with 0/5 strength, normal speech, no focal findings or movement disorder noted  Psych:  Normal affect without evidence of depression or anxiety, insight and judgement are intact, memory appears intact. He is routinely pleasant and communicates well with staff. Staff take good care of him. Skin: warm and dry, no rash or erythema; BLE are wrapped with Unna Boots    LABS:  10/2/20:  WBC 10.2, HGB 7.5, platelets 166, Na 767, K 4.1, chloride 101, CO2 36, anion gap 0, BUN 34, Creat 1.99, GFR 37, calcium 8.4, phos 3.7, mag 2.3, albumin 2.7. ASSESSMENT & PLAN:    1. Acute kidney failure  Continue hemodialysis M-W-F. Encourage 1400 ml fluid restriction - he has been non-compliant with fluid restrictions in the past.  This CNP initiated Ativan, 0.5 mg po prior to dialysis appointments as needed for anxiety. He had previously refused dialysis because he did not like the [facility] lift and outpatient dialysis did not have a bed for him to rest in (he c/o pain to his coccyx wound when up in a chair). Today, he tells me that he will \"try dialysis\". Appreciate staff's encouragement towards him. F/U with Dr. Rupinder Polanco as indicated. 2.  Urinary retention  OK to maintain indwelling soto catheter for urinary retention due to edema. 3. Decubitus ulcer of sacral region, stage 4 (HCC)  Continue Dakins dressings. F/U with Dr. Ronen Rinaldi as indicated. Appreciate Wound Nurse's input. Asking staff to continue to monitor. Continue MVI and Zinc supplements. 4.  COPD  Stable at time of assessment. Continue prn DuoNebs and supplemental oxygen. 5. Essential hypertension  BP is at goal with Metoprolol 50 mg po every 8 hours.   Lisinopril was stopped for high potassium levels - has done well off warranted. Total time:  40 minutes    Plan of care reviewed by Dr. Vlad Contreras DO.   Electronically signed by GAYATHRI Devine NP on 10/6/2020 at 2:52 PM

## 2020-10-26 ENCOUNTER — OUTSIDE SERVICES (OUTPATIENT)
Dept: FAMILY MEDICINE CLINIC | Age: 40
End: 2020-10-26
Payer: MEDICARE

## 2020-10-26 PROCEDURE — 99309 SBSQ NF CARE MODERATE MDM 30: CPT | Performed by: NURSE PRACTITIONER

## 2020-10-27 NOTE — PROGRESS NOTES
[address] Progress Note    NAME: Elian Brewer  DATE: 10/26/20  ROOM #: B 30-1  : 1980  REASON FOR VISIT: Other (Acute visit for sob and refusal of care)    CODE STATUS: Full Code    History obtained from chart review, the patient and staff. SUBJECTIVE:  HPI: Savanah Larson is a 36 y.o. male. Patient has a PMH significant for:  Past Medical History:   Diagnosis Date    Diabetes ([address]     HTN (hypertension)     Insomnia     Morbid obesity ([address]     Pressure ulcer        Pt seen and examined at bedside today for staff's concerns of sob when performing wound care and refusing care. He is in bed at time of assessment and is noted to be in no acute distress. Other than acknowledging being tired this morning, he reports feeling well and has no questions or concerns. Staff provide appropriate updates; appreciate staff input. I have reviewed patients past medical, surgical, social, and family history and have made updates where appropriate. Allergies and Medications were reviewed through the [facility] EMR.     Patient Active Problem List    Diagnosis Date Noted    Acute kidney failure, unspecified ([address] 09/15/2020    Desir catheter in place 09/15/2020    Encounter for dialysis catheter care ([address] 09/15/2020    Cellulitis and abscess of right leg 2020    Personal history of noncompliance with medical treatment, presenting hazards to health 2020    Anasarca 2020    Diabetes mellitus ([address] 2020    Scrotal edema 2019    Bilateral leg edema 10/09/2019    Major depressive disorder, recurrent episode, moderate ([address] 2019    Colostomy status ([address] 2019    Generalized muscle weakness 2019    Other chronic pain 2019    Morbid obesity ([address]     Diabetes ([address]     Essential hypertension     Primary insomnia     Nursing home resident 2018    Morbid obesity due to excess calories ([address] 2018    Stage IV pressure ulcer of sacral [facility] 11/08/2018     REVIEW OF SYSTEMS  Positive responses are highlighted in bold  Constitutional:  Fever, Chills, Night Sweats, Fatigue, Unexpected changes in weight  HENT:  Ear pain, Tinnitus, Nosebleeds, Trouble swallowing, Hearing loss, Sore throat  Cardiovascular:  Chest Pain, Palpitations, Orthopnea, Paroxysmal Nocturnal Dyspnea  Respiratory:  Cough, Wheezing, Shortness of breath, Chest tightness, Apnea  Gastrointestinal:  Nausea, Vomiting, Diarrhea, Constipation, Heartburn, Blood in stool  Genitourinary:  Difficulty or painful urination, Flank pain, Change in frequency, Urgency  Skin:  Color change, Rash, Itching, Wound  Musculoskeletal:  Joint pain, Back pain, Gait problems, Joint swelling, Myalgias  Neurological:  Dizziness, Headaches, Presyncope, Numbness, Seizures, Tremors  Endocrine:  Heat Intolerance, Cold Intolerance, Polydipsia, Polyphagia, Polyuria    OBJECTIVE:  PHYSICAL EXAM:  VS:  132/74, 98.3, 81, 18, 110 blood glucose, 90% on supplemental oxygen. Weight in pounds:  460.9 (was 473.0, 448.1, 447.9, 434.1)  Pain: Denies    VS reviewed. General Appearance: morbidly obese, debilitated, in no acute distress; bed ridden. Head: normocephalic and atraumatic  Eyes: conjunctivae and eye lids without erythema  ENT: external ear and ear canal normal bilaterally, nose without deformity, nasal mucosa and turbinates normal without polyps, oropharynx normal, dentition is normal for age, no lip or gum lesions noted  Neck: supple and non-tender without mass, no thyromegaly or thyroid nodules, no cervical lymphadenopathy  Pulmonary/Chest: clear but diminished to auscultation bilaterally- no wheezes, rales or rhonchi, normal air movement, no respiratory distress or retractions, requires supplemental oxygen at time of assessment  Cardiovascular: normal rate, regular rhythm, normal S1 and S2, no murmurs, rubs, clicks, or gallops, distal pulses intact. Right chest dialysis catheter is in place.   Abdomen: obese abdomen is soft, non-tender, non-distended, bowel sounds physiologic,  no rebound or guarding, no masses or hernias noted. Liver and spleen without enlargement. Genitourinary:  Indwelling soto catheter drains clear yellow urine. Extremities: no cyanosis, clubbing. Chronic, 3+ pitting edema to BLE. Musculoskeletal: No joint swelling or gross deformity; He is bed ridden. Neuro:  Alert and oriented, 4/5 strength to BUE. BLE with 0/5 strength, normal speech, no focal findings or movement disorder noted  Psych:  Normal affect without evidence of depression or anxiety, insight and judgement are intact, memory appears intact. He is routinely pleasant and communicates well with staff. Staff take good care of him. Skin: warm and dry, no rash or erythema; BLE are wrapped with Unna Boots    ASSESSMENT & PLAN:    1. Acute kidney failure  Continue hemodialysis M-W-F. Encourage 1400 ml fluid restriction - he has been non-compliant with fluid restrictions in the past.  This CNP initiated Ativan, 0.5 mg po prn for anxiety prior to dialysis appointments. He had previously refused dialysis because he did not like the [facility] lift and outpatient dialysis did not have a bed for him to rest in (he c/o pain to his coccyx wound when up in a chair). Appreciate staff's encouragement towards him. F/U with Dr. Ollie Kwon as indicated. 2.  Urinary retention  OK to maintain indwelling soto catheter for urinary retention due to edema. 3. Decubitus ulcer of sacral region, stage 4 [facility])  Staff report quality of wound is declining and will update Dr. Jayne Amato. Continue Dakins dressings for now. Asking for CBC and BMP on Wednesday. F/U with Dr. Jayne Amato as indicated. Dr. Jayne Amato has updated that Brooks's wound is chronic and will never heal and that his long-term prognosis is poor. Asking staff to continue to monitor. Continue MVI and Zinc supplements. 4.  COPD  Stable at time of assessment.   Continue prn DuoNebs and

## 2020-10-28 ENCOUNTER — OUTSIDE SERVICES (OUTPATIENT)
Dept: FAMILY MEDICINE CLINIC | Age: 40
End: 2020-10-28
Payer: MEDICARE

## 2020-10-28 VITALS
HEART RATE: 88 BPM | TEMPERATURE: 98.8 F | WEIGHT: 315 LBS | RESPIRATION RATE: 18 BRPM | SYSTOLIC BLOOD PRESSURE: 125 MMHG | DIASTOLIC BLOOD PRESSURE: 78 MMHG

## 2020-10-28 PROBLEM — Z99.2 DIALYSIS PATIENT (HCC): Chronic | Status: ACTIVE | Noted: 2020-10-28

## 2020-10-28 PROCEDURE — 99308 SBSQ NF CARE LOW MDM 20: CPT | Performed by: FAMILY MEDICINE

## 2020-10-28 ASSESSMENT — ENCOUNTER SYMPTOMS
COUGH: 0
BACK PAIN: 0
ABDOMINAL PAIN: 0
DIARRHEA: 0
SORE THROAT: 0
SHORTNESS OF BREATH: 0
WHEEZING: 0
VOMITING: 0
CONSTIPATION: 0
NAUSEA: 0

## 2020-11-16 ENCOUNTER — OUTSIDE SERVICES (OUTPATIENT)
Dept: FAMILY MEDICINE CLINIC | Age: 40
End: 2020-11-16

## 2020-11-16 PROBLEM — U07.1 COVID-19: Status: ACTIVE | Noted: 2020-11-16

## 2020-11-16 NOTE — PROGRESS NOTES
[facility] Progress Note    NAME: Britt Landry  DATE: 20  ROOM #: B 30-1  : 1980  REASON FOR VISIT: Other (routine visit)    CODE STATUS: Full Code    History obtained from chart review, the patient and staff. SUBJECTIVE:  HPI: Richy Corey is a 36 y.o. male. Patient has a PMH significant for:  Past Medical History:   Diagnosis Date    Diabetes ([address]     HTN (hypertension)     Insomnia     Morbid obesity ([address]     Pressure ulcer        Pt seen and examined at bedside today for routine visit and is noted to be in no acute distress. He reports feeling well and has no questions or concerns. Staff provide appropriate updates; appreciate staff input. I have reviewed patients past medical, surgical, social, and family history and have made updates where appropriate. Allergies and Medications were reviewed through the [facility] EMR.     Patient Active Problem List    Diagnosis Date Noted    Dialysis patient [facility]) 10/28/2020    Acute kidney failure, unspecified ([address] 09/15/2020    Desir catheter in place 09/15/2020    Encounter for dialysis catheter care ([address] 09/15/2020    Cellulitis and abscess of right leg 2020    Personal history of noncompliance with medical treatment, presenting hazards to health 2020    Anasarca 2020    Diabetes mellitus ([address] 2020    Scrotal edema 2019    Bilateral leg edema 10/09/2019    Major depressive disorder, recurrent episode, moderate ([address] 2019    Colostomy status ([address] 2019    Generalized muscle weakness 2019    Other chronic pain 2019    Morbid obesity ([address]     Diabetes ([address]     Essential hypertension     Primary insomnia     Nursing home resident 2018    Morbid obesity due to excess calories ([address] 2018    Stage IV pressure ulcer of sacral region ([address] 2018     REVIEW OF SYSTEMS  Positive responses are highlighted in bold  Constitutional:  Fever, Chills, Night Sweats, Fatigue, Unexpected changes in weight  HENT:  Ear pain, Tinnitus, Nosebleeds, Trouble swallowing, Hearing loss, Sore throat  Cardiovascular:  Chest Pain, Palpitations, Orthopnea, Paroxysmal Nocturnal Dyspnea  Respiratory:  Cough, Wheezing, Shortness of breath, Chest tightness, Apnea  Gastrointestinal:  Nausea, Vomiting, Diarrhea, Constipation, Heartburn, Blood in stool  Genitourinary:  Difficulty or painful urination, Flank pain, Change in frequency, Urgency  Skin:  Color change, Rash, Itching, Wound  Musculoskeletal:  Joint pain, Back pain, Gait problems, Joint swelling, Myalgias  Neurological:  Dizziness, Headaches, Presyncope, Numbness, Seizures, Tremors  Endocrine:  Heat Intolerance, Cold Intolerance, Polydipsia, Polyphagia, Polyuria    OBJECTIVE:  PHYSICAL EXAM:  VS:  132/82, 98.3, 81, 19, 165 blood glucose, 92% on supplemental oxygen. Weight in pounds:  He refuses in-house weights. Pt will be weighed with dialysis. 460.9 (was 473.0, 448.1, 447.9, 434.1)  Pain: Denies    VS reviewed. General Appearance: morbidly obese, debilitated, in no acute distress; bed ridden. Head: normocephalic and atraumatic  Eyes: conjunctivae and eye lids without erythema  ENT: external ear and ear canal normal bilaterally, nose without deformity, nasal mucosa and turbinates normal without polyps, oropharynx normal, dentition is normal for age, no lip or gum lesions noted  Neck: supple and non-tender without mass, no thyromegaly or thyroid nodules, no cervical lymphadenopathy  Pulmonary/Chest: clear but diminished to auscultation bilaterally- no wheezes, rales or rhonchi, normal air movement, no respiratory distress or retractions, requires supplemental oxygen at time of assessment  Cardiovascular: normal rate, regular rhythm, normal S1 and S2, no murmurs, rubs, clicks, or gallops, distal pulses intact. Right chest dialysis catheter is in place.   Abdomen: obese abdomen is soft, non-tender, non-distended, bowel sounds physiologic,  no rebound or guarding, no masses or hernias noted. Liver and spleen without enlargement. Genitourinary:  Indwelling soto catheter drains clear yellow urine. Extremities: no cyanosis, clubbing. Chronic, 3+ pitting edema to BLE. Musculoskeletal: No joint swelling or gross deformity; He is bed ridden. Neuro:  Alert and oriented, 4/5 strength to BUE. BLE with 0/5 strength, normal speech, no focal findings or movement disorder noted  Psych:  Normal affect without evidence of depression or anxiety, insight and judgement are intact, memory appears intact. He is routinely pleasant and communicates well with staff. Staff take good care of him. Skin: warm and dry, no rash or erythema; BLE are wrapped with Unna Boots    ASSESSMENT & PLAN:    1. Acute kidney failure  Continue hemodialysis M-W-F. Encourage 1100 ml fluid restriction - he has been non-compliant with fluid restrictions in the past.  Continue Ativan, 0.5 mg po prn prior to dialysis appointments for anxiety. He says this helps with anxiety. He acknowledges that Dialysis is going \"better than he thought it would\". Appreciate staff encouragement and support. F/U with Dr. Kori Lugo as indicated. 2.  Urinary retention  OK to maintain indwelling soto catheter for urinary retention due to edema. 3. Decubitus ulcer of sacral region, stage 4 (HCC)  Continue Dakins dressings and Cipro for  P aeruginosa in the wound. He reports feeling well and denies fevers and chills. F/U with Dr. Chandler Carrasco as indicated. Appreciate Wound Nurse's input. Asking staff to continue to monitor. Continue MVI and Zinc supplements. 4.  COPD  Stable at time of assessment. Continue prn DuoNebs and supplemental oxygen. 5. Essential hypertension  BP is at goal with Metoprolol 50 mg po every 8 hours. Lisinopril was stopped for high potassium levels - has done well off Lisinopril. He denies chest pain, pressure, heaviness. No SOB.   No episodes of hypotension or bradycardia. 6. Other specified diabetes mellitus with other specified complication, with long-term current use of insulin (HCC)  Metformin and Januvia were stopped due to poor kidney function. Continue sliding scale Humalog, Levemir. No episodes of hypoglycemia. A1C per routine. Continues to be noncompliant with diet and family brings him fast food often. 7. Other chronic pain  Continue prn Oxycodone and prn Tylenol. He reports wound pain with dressing changes. 8. Primary insomnia  Continue home dose of Ambien. Denies problems sleeping at night. 9.  Bilateral lower leg edema  3+ pitting edema persists. He is often non-compliant with fluid restriction, wrapping BLE and elevating scrotum, in-house weights. Routine weights with Dialysis. 10.  Major depressive disorder, recurrent. No SI/HI. He smiles and makes eye contact and engages in conversation. He is appreciative and reports dialysis is going well. He has declined consultation to Psych and Counseling services several times in the past.  Continue current dosage of Zoloft 100 mg po once daily. Will look at decreasing dosage of Zoloft once he is up in his customized wheelchair more often. Antipsychotic/Antianxiety/Hypnotic/Psychotropic/Sedation/Antidepressant medications are continued at this time because discontinuation may result in adverse effects of return or concerning behaviors/symptoms. 11.  Hyperlipidemia  ASA, statin. Routine lab monitoring. 12.  Hypocalcemia  Continue supplemental calcium and vitamin D. Routine lab monitoring. 13.  Attention to Colostomy  Diverting colostomy continues to drain brown liquid stool. Staff report no concerns. 14. Morbid obesity due to excess calories (HCC)  Appetite good. Noncompliant with diabetic diet. Encourage patient to be oob in wheelchair on a daily basis. He continues to refuse.     15.  COVID-19  He tested positive for COVID-19 and was placed in droplet isolation which

## 2020-12-23 ENCOUNTER — OUTSIDE SERVICES (OUTPATIENT)
Dept: FAMILY MEDICINE CLINIC | Age: 40
End: 2020-12-23
Payer: MEDICARE

## 2020-12-23 PROCEDURE — 99309 SBSQ NF CARE MODERATE MDM 30: CPT | Performed by: NURSE PRACTITIONER

## 2020-12-23 NOTE — PROGRESS NOTES
[address] [facility] &20 Progress Note    NAME: Angie Landry  DATE: 20  ROOM #: B 30-1  : 1980  REASON FOR VISIT: Other (routine visit)    CODE STATUS: Full Code    History obtained from chart review, the patient and staff. SUBJECTIVE:  HPI: Kami Jaramillo is a 36 y.o. male. Patient has a PMH significant for:  Past Medical History:   Diagnosis Date    Diabetes ([address]     HTN (hypertension)     Insomnia     Morbid obesity ([address]     Pressure ulcer        Pt seen and examined at bedside today for routine visit and is noted to be in no acute distress. He reports feeling well and has no questions or concerns. Staff provide appropriate updates; appreciate staff input. I have reviewed patients past medical, surgical, social, and family history and have made updates where appropriate. Allergies and Medications were reviewed through the [facility] EMR.     Patient Active Problem List    Diagnosis Date Noted    COVID-19 2020    Dialysis patient [facility]) 10/28/2020    Acute kidney failure, unspecified ([address] 09/15/2020    Desir catheter in place 09/15/2020    Encounter for dialysis catheter care ([address] 09/15/2020    Cellulitis and abscess of right leg 2020    Personal history of noncompliance with medical treatment, presenting hazards to health 2020    Anasarca 2020    Diabetes mellitus ([address] 2020    Scrotal edema 2019    Bilateral leg edema 10/09/2019    Major depressive disorder, recurrent episode, moderate ([address] 2019    Colostomy status ([address] 2019    Generalized muscle weakness 2019    Other chronic pain 2019    Morbid obesity ([address]     Diabetes ([address]     Essential hypertension     Primary insomnia     Nursing home resident 2018    Morbid obesity due to excess calories ([address] 2018    Stage IV pressure ulcer of sacral region ([address] 2018     REVIEW OF SYSTEMS  Positive responses are highlighted in bold Constitutional:  Fever, Chills, Night Sweats, Fatigue, Unexpected changes in weight  HENT:  Ear pain, Tinnitus, Nosebleeds, Trouble swallowing, Hearing loss, Sore throat  Cardiovascular:  Chest Pain, Palpitations, Orthopnea, Paroxysmal Nocturnal Dyspnea  Respiratory:  Cough, Wheezing, Shortness of breath, Chest tightness, Apnea  Gastrointestinal:  Nausea, Vomiting, Diarrhea, Constipation, Heartburn, Blood in stool  Genitourinary:  Difficulty or painful urination, Flank pain, Change in frequency, Urgency  Skin:  Color change, Rash, Itching, Wound  Musculoskeletal:  Joint pain, Back pain, Gait problems, Joint swelling, Myalgias  Neurological:  Dizziness, Headaches, Presyncope, Numbness, Seizures, Tremors  Endocrine:  Heat Intolerance, Cold Intolerance, Polydipsia, Polyphagia, Polyuria    OBJECTIVE:  PHYSICAL EXAM:  VS:  107/57, 97.3, 76, 17, 151 blood glucose, 93% on room air  Weight in pounds:  He refuses in-house weights. Pt will be weighed with dialysis. 429.9 (was 460.9, 473.0)  Pain: Denies    VS reviewed. General Appearance: morbidly obese, debilitated, in no acute distress; He requests to stay in bed at all times. Head: normocephalic and atraumatic  Eyes: conjunctivae and eye lids without erythema  ENT: external ear and ear canal normal bilaterally, nose without deformity, nasal mucosa and turbinates normal without polyps, oropharynx normal, dentition is normal for age, no lip or gum lesions noted  Neck: supple and non-tender without mass, no thyromegaly or thyroid nodules, no cervical lymphadenopathy  Pulmonary/Chest: clear but diminished to auscultation bilaterally- no wheezes, rales or rhonchi, normal air movement, no respiratory distress or retractions, requires no use of supplemental oxygen at time of assessment  Cardiovascular: normal rate, regular rhythm, normal S1 and S2, no murmurs, rubs, clicks, or gallops, distal pulses intact. Right chest dialysis catheter is in place. Abdomen: obese abdomen is soft, non-tender, non-distended, bowel sounds physiologic,  no rebound or guarding, no masses or hernias noted. Liver and spleen without enlargement. Genitourinary:  Indwelling soto catheter drains clear yellow urine. Extremities: no cyanosis, clubbing. Chronic, 3+ pitting edema to BLE. Musculoskeletal: No joint swelling or gross deformity; He is bed ridden. Neuro:  Alert and oriented, 4/5 strength to BUE. BLE with 0/5 strength, normal speech, no focal findings or movement disorder noted  Psych:  Normal affect without evidence of depression or anxiety, insight and judgement are intact, memory appears intact. He is routinely pleasant and communicates well with staff. Staff take good care of him. Skin: warm and dry, no rash or erythema; BLE are wrapped with ACE wrap    ASSESSMENT & PLAN:    1. Acute kidney failure  Continue hemodialysis M-W-F. Encourage 1100 ml fluid restriction - he has been non-compliant with fluid restrictions in the past.  Continue Ativan, 0.5 mg po prn prior to dialysis appointments for anxiety. He says this helps with anxiety. He acknowledges that Dialysis is going \"better than he thought it would\". Appreciate staff encouragement and support. F/U with Dr. Shalonda Bolton as indicated. 2.  Urinary retention  OK to maintain indwelling soto catheter for urinary retention due to edema. 3. Decubitus ulcer of sacral region, stage 4 (HCC)  Continue Dakins dressings and Cipro for  P aeruginosa in the wound. He reports feeling well and denies fevers and chills. F/U with Dr. Alverto Redd as indicated. Appreciate Wound Nurse's input. Asking staff to continue to monitor. Continue MVI and Zinc supplements. 4.  COPD  Stable at time of assessment. Continue prn DuoNebs and supplemental oxygen as needed. 5. Essential hypertension  BP is at goal with Metoprolol 50 mg po every 8 hours. Lisinopril was stopped for high potassium levels - has done well off Lisinopril. He denies chest pain, pressure, heaviness. No SOB. No episodes of hypotension or bradycardia. 6. Other specified diabetes mellitus with other specified complication, with long-term current use of insulin (HCC)  Metformin and Januvia were stopped due to poor kidney function. Continue sliding scale Humalog as ordered. Will decrease dosage of Levemir from 40 units sq daily at HS to 30 units sq daily at HS due to lower blood glucose levels. A1C per routine. Continue carb controlled diet. Not sure if family is bringing in food very often any more. 7. Other chronic pain  Continue prn Oxycodone and prn Tylenol. He reports wound pain with dressing changes. 8. Primary insomnia  Continue home dose of Ambien. Denies problems sleeping at night. 9.  Bilateral lower leg edema  3+ pitting edema persists. He is often non-compliant with fluid restriction, wrapping BLE and elevating scrotum, in-house weights. Routine weights with Dialysis are improving. 10.  Major depressive disorder, recurrent. No SI/HI. He smiles and makes eye contact and engages in conversation. He is appreciative and reports dialysis is going well. He has declined consultation to Psych and Counseling services several times in the past.  Continue current dosage of Zoloft 100 mg po once daily. Considered decreasing dosage of Zoloft once he is up in his customized wheelchair more often but he refuses to be OOB. Antipsychotic/Antianxiety/Hypnotic/Psychotropic/Sedation/Antidepressant medications are continued at this time because discontinuation may result in adverse effects of return or concerning behaviors/symptoms. 11.  Hyperlipidemia  ASA, statin. Routine lab monitoring. 12.  Hypocalcemia  Continue supplemental calcium and vitamin D. Routine lab monitoring. 13.  Attention to Colostomy  Diverting colostomy continues to drain brown liquid stool. Staff report no concerns.     14. Morbid obesity due to excess calories ([address] Appetite good. Noncompliant with diabetic diet. Encourage patient to be oob in wheelchair on a daily basis. He continues to refuse. 15.  COVID-19  Resolved. He remains asymptomatic. Disposition:  Assessment and plan as stated above. Follow-up per routine and as warranted. Plan of care reviewed by Dr. Darryl Alfonso DO.   Electronically signed by GAYATHRI Covington NP on 12/23/2020 at 2:51 PM

## 2021-01-21 VITALS
SYSTOLIC BLOOD PRESSURE: 147 MMHG | HEART RATE: 79 BPM | TEMPERATURE: 97.6 F | RESPIRATION RATE: 18 BRPM | DIASTOLIC BLOOD PRESSURE: 83 MMHG | WEIGHT: 315 LBS

## 2021-01-21 ASSESSMENT — ENCOUNTER SYMPTOMS
SORE THROAT: 0
BACK PAIN: 0
ABDOMINAL PAIN: 0
SHORTNESS OF BREATH: 0
CONSTIPATION: 0
COUGH: 0
NAUSEA: 0
VOMITING: 0
WHEEZING: 0
DIARRHEA: 0

## 2021-01-21 NOTE — PROGRESS NOTES
Marina Zurita is a 36 y.o. male that is being seen at [facility] [address] for Diabetes and Other ([place])      Marina Zurita  1980 1/21/21      HPI:  Patient seen and examined at bedside. History obtain from patient and chart. Patient states that he is doing well, denies any acute issues. He has lost quite a bit of weight since he started HD. He has noted that his LE and scrotal edema has improved quite a bit. Denies CP, SOB, Constipation. Eating well. Back pain is about the same overall, oxycodone still helps. I have reviewed the patient's past medical history, past surgical history, allergies,medications, social and family history and I have made updates where appropriate. Past Medical History:   Diagnosis Date    Diabetes ([facility]     HTN (hypertension)     Insomnia     Morbid obesity (HCC)     Pressure ulcer        No past surgical history on file. Social History     Tobacco Use    Smoking status: Not on file   Substance Use Topics    Alcohol use: Not on file    Drug use: Not on file       No family history on file. Review of Systems   Constitutional: Negative for chills and fever. HENT: Negative for hearing loss and sore throat. Respiratory: Negative for cough, shortness of breath and wheezing. Cardiovascular: Negative for chest pain and palpitations. Gastrointestinal: Negative for abdominal pain, constipation, diarrhea, nausea and vomiting. Genitourinary: Negative for dysuria and hematuria. Musculoskeletal: Negative for back pain. Skin: Positive for wound. Neurological: Negative for headaches. PHYSICAL EXAM:    BP (!) 147/83   Pulse 79   Temp 97.6 °F (36.4 °C)   Resp 18   Wt (!) 429 lb (194.6 kg)       Physical Exam  Vitals signs and nursing note reviewed. Constitutional:       General: He is not in acute distress. Appearance: He is well-developed. HENT:      Head: Normocephalic and atraumatic.       Right Ear: Hearing and external ear normal. Left Ear: Hearing and external ear normal.      Nose: Nose normal.   Eyes:      General:         Right eye: No discharge. Left eye: No discharge. Conjunctiva/sclera: Conjunctivae normal.   Neck:      Musculoskeletal: Normal range of motion and neck supple. Thyroid: No thyromegaly. Trachea: No tracheal deviation. Cardiovascular:      Rate and Rhythm: Normal rate and regular rhythm. Heart sounds: Normal heart sounds. No murmur. No friction rub. No gallop. Pulmonary:      Effort: Pulmonary effort is normal. No respiratory distress. Breath sounds: No wheezing or rales. Chest:      Chest wall: No tenderness. Musculoskeletal:         General: Swelling (severe lymphedema of the LEs bilaterally) present. No deformity. Lymphadenopathy:      Cervical: No cervical adenopathy. Skin:     General: Skin is warm and dry. Findings: No erythema. Neurological:      Mental Status: He is alert. Motor: No abnormal muscle tone. Coordination: Coordination normal.   Psychiatric:         Behavior: Behavior normal.         Thought Content: Thought content normal.         Judgment: Judgment normal.         ASSESSMENT & PLAN  Sara Larkin was seen today for diabetes and other. Diagnoses and all orders for this visit:    Essential hypertension    Stage IV pressure ulcer of sacral region [facility])    Colostomy status ([facility]    Dialysis patient ([facility]    Major depressive disorder, recurrent episode, moderate ([address]    Type 2 diabetes mellitus with other specified complication, without long-term current use of insulin ([address]    Morbid obesity due to excess calories ([address]    Primary insomnia    Other chronic pain            1. Decubitus Ulcer:  cont wound care, Oxycodone  2. DM2:  cont Levemir, Metformin, SS insulin, Linagliptin  3. HTN:  cont Metoprolol,   4.  Insomnia:  cont Coit Diones

## 2021-01-22 ENCOUNTER — OUTSIDE SERVICES (OUTPATIENT)
Dept: FAMILY MEDICINE CLINIC | Age: 41
End: 2021-01-22
Payer: MEDICARE

## 2021-01-22 DIAGNOSIS — Z99.2 DIALYSIS PATIENT (HCC): ICD-10-CM

## 2021-01-22 DIAGNOSIS — L89.154 STAGE IV PRESSURE ULCER OF SACRAL REGION (HCC): ICD-10-CM

## 2021-01-22 DIAGNOSIS — I10 ESSENTIAL HYPERTENSION: Primary | Chronic | ICD-10-CM

## 2021-01-22 DIAGNOSIS — F51.01 PRIMARY INSOMNIA: Chronic | ICD-10-CM

## 2021-01-22 DIAGNOSIS — F33.1 MAJOR DEPRESSIVE DISORDER, RECURRENT EPISODE, MODERATE (HCC): ICD-10-CM

## 2021-01-22 DIAGNOSIS — E66.01 MORBID OBESITY DUE TO EXCESS CALORIES (HCC): ICD-10-CM

## 2021-01-22 DIAGNOSIS — Z93.3 COLOSTOMY STATUS (HCC): ICD-10-CM

## 2021-01-22 DIAGNOSIS — G89.29 OTHER CHRONIC PAIN: ICD-10-CM

## 2021-01-22 DIAGNOSIS — E11.69 TYPE 2 DIABETES MELLITUS WITH OTHER SPECIFIED COMPLICATION, WITHOUT LONG-TERM CURRENT USE OF INSULIN (HCC): ICD-10-CM

## 2021-01-22 PROCEDURE — 99309 SBSQ NF CARE MODERATE MDM 30: CPT | Performed by: FAMILY MEDICINE

## 2021-02-23 ENCOUNTER — OUTSIDE SERVICES (OUTPATIENT)
Dept: FAMILY MEDICINE CLINIC | Age: 41
End: 2021-02-23
Payer: MEDICARE

## 2021-02-23 DIAGNOSIS — I10 ESSENTIAL HYPERTENSION: Chronic | ICD-10-CM

## 2021-02-23 DIAGNOSIS — L89.154 STAGE IV PRESSURE ULCER OF SACRAL REGION (HCC): Chronic | ICD-10-CM

## 2021-02-23 DIAGNOSIS — G89.29 OTHER CHRONIC PAIN: ICD-10-CM

## 2021-02-23 DIAGNOSIS — E66.01 MORBID OBESITY DUE TO EXCESS CALORIES (HCC): Chronic | ICD-10-CM

## 2021-02-23 DIAGNOSIS — Z49.01 ENCOUNTER FOR DIALYSIS CATHETER CARE (HCC): ICD-10-CM

## 2021-02-23 DIAGNOSIS — M62.81 GENERALIZED MUSCLE WEAKNESS: ICD-10-CM

## 2021-02-23 DIAGNOSIS — Z93.3 COLOSTOMY STATUS (HCC): ICD-10-CM

## 2021-02-23 DIAGNOSIS — F33.1 MAJOR DEPRESSIVE DISORDER, RECURRENT EPISODE, MODERATE (HCC): ICD-10-CM

## 2021-02-23 DIAGNOSIS — Z99.2 DIALYSIS PATIENT (HCC): Primary | Chronic | ICD-10-CM

## 2021-02-23 DIAGNOSIS — E13.69 OTHER SPECIFIED DIABETES MELLITUS WITH OTHER SPECIFIED COMPLICATION, WITHOUT LONG-TERM CURRENT USE OF INSULIN (HCC): ICD-10-CM

## 2021-02-23 DIAGNOSIS — Z97.8 FOLEY CATHETER IN PLACE: ICD-10-CM

## 2021-02-23 DIAGNOSIS — F51.01 PRIMARY INSOMNIA: Chronic | ICD-10-CM

## 2021-02-23 DIAGNOSIS — N17.9 ACUTE RENAL FAILURE, UNSPECIFIED ACUTE RENAL FAILURE TYPE (HCC): ICD-10-CM

## 2021-02-23 PROCEDURE — 99309 SBSQ NF CARE MODERATE MDM 30: CPT | Performed by: NURSE PRACTITIONER

## 2021-02-23 NOTE — PROGRESS NOTES
Fever, Chills, Night Sweats, Fatigue, Unexpected changes in weight, Generalized Pain to coccyx wound   HENT:  Ear pain, Tinnitus, Nosebleeds, Trouble swallowing, Hearing loss, Sore throat  Cardiovascular:  Chest Pain, Palpitations, Orthopnea, Paroxysmal Nocturnal Dyspnea  Respiratory:  Cough, Wheezing, Shortness of breath, Chest tightness, Apnea  Gastrointestinal:  Nausea, Vomiting, Diarrhea, Constipation, Heartburn, Blood in stool  Genitourinary:  Difficulty or painful urination, Flank pain, Change in frequency, Urgency  Skin:  Color change, Rash, Itching, Wound  Musculoskeletal:  Joint pain, Back pain, Gait problems, Joint swelling, Myalgias  Neurological:  Dizziness, Headaches, Presyncope, Numbness, Seizures, Tremors  Endocrine:  Heat Intolerance, Cold Intolerance, Polydipsia, Polyphagia, Polyuria    OBJECTIVE:  PHYSICAL EXAM:  VS:  118/70, 97.0 F, 81bpm, 18, 121 BG, 94% 2-3L Nasal Cannula   Weight in pounds:  He refuses in-house weights. Pt will be weighed with dialysis. 347.3 (was 429.9, 460.9, 473.0)  Pain: Generalized pain, but is now tolerable. VS reviewed. General Appearance: morbidly obese, debilitated, in no acute distress; He requests to stay in bed at all times. Head: normocephalic and atraumatic  Eyes: conjunctivae and eye lids without erythema  ENT: external ear and ear canal normal bilaterally, nose without deformity, nasal mucosa and turbinates normal without polyps, oropharynx normal, dentition is normal for age, no lip or gum lesions noted  Neck: supple and non-tender without mass, no thyromegaly or thyroid nodules, no cervical lymphadenopathy  Pulmonary/Chest: clear but diminished to auscultation bilaterally- no wheezes, rales or rhonchi, normal air movement, no respiratory distress or retractions, requires no use of supplemental oxygen at time of assessment  Cardiovascular: normal rate, regular rhythm, normal S1 and S2, no murmurs, rubs, clicks, or gallops, distal pulses intact.   Right chest dialysis catheter is in place. Abdomen: obese abdomen is soft, non-tender, non-distended, bowel sounds physiologic,  no rebound or guarding, no masses or hernias noted. Liver and spleen without enlargement. Colostomy in LUQ  Genitourinary:  Indwelling soto catheter drains clear yellow urine. Extremities: no cyanosis, clubbing. Chronic, 1+ pitting edema to BLE. Musculoskeletal: No joint swelling or gross deformity; He is bed ridden. Neuro:  Alert and oriented, 4/5 strength to BUE. BLE with 0/5 strength, normal speech, no focal findings or movement disorder noted  Psych:  Normal affect without evidence of depression or anxiety, insight and judgement are intact, memory appears intact. He is routinely pleasant and communicates well with staff. Staff take good care of him. Skin: warm and dry, no rash or erythema;    ASSESSMENT & PLAN:    1. Acute kidney failure  Continue hemodialysis M-W-F. Encourage 1100 ml fluid restriction - he is requesting to stop fluid restriction. Will have to address with Dr. Rosanna Culver, 0.5 mg po prn prior to dialysis appointments for anxiety. He says this helps with anxiety. He acknowledges that Dialysis is going \"better than he thought it would\". States they are considering a fistula. Appreciate staff encouragement and support. F/U with Dr. Dom Miller as indicated. 2.  Urinary retention  OK to maintain indwelling soto catheter for urinary retention due to edema. 3. Decubitus ulcer of sacral region, stage 4 (HCC)  Continue Dakins dressings and Cipro for  P aeruginosa in the wound. He reports feeling well and denies fevers and chills. F/U with Dr. Ronald Man as indicated. Appreciate Wound Nurse's input. Asking staff to continue to monitor. Continue MVI and Zinc supplements. 4.  COPD  Stable at time of assessment. Continue prn DuoNebs and supplemental oxygen as needed.     5. Essential hypertension  BP is at goal with Metoprolol 50 mg po every 8 hours. Lisinopril was stopped for high potassium levels - has done well off Lisinopril. He denies chest pain, pressure, heaviness. No SOB. No episodes of hypotension or bradycardia. 6. Other specified diabetes mellitus with other specified complication, with long-term current use of insulin (HCC)  Metformin and Januvia were stopped due to poor kidney function. Continue sliding scale Humalog as ordered. Levemir 12 units SQ daily   A1C per routine. Continue carb controlled diet. Family is no longer bringing in fast food. 7. Other chronic pain  Continue prn Oxycodone and prn Tylenol. He reports wound pain with dressing changes. 8. Primary insomnia  Continue home dose of Ambien. Denies problems sleeping at night. 9.  Bilateral lower leg edema  Bilateral Edema has much improved. 1+ pitting edema BLE  He is improving with fluid restriction, in-house weights. Routine weights with Dialysis are improving. 10.  Major depressive disorder, recurrent. No SI/HI. He smiles and makes eye contact and engages in conversation. He is appreciative and reports dialysis is going well. He has declined consultation to Psych and Counseling services several times in the past.  Continue current dosage of Zoloft 100 mg po once daily. Considered decreasing dosage of Zoloft once he is up in his customized wheelchair more often but he refuses to be OOB. Antipsychotic/Antianxiety/Hypnotic/Psychotropic/Sedation/Antidepressant medications are continued at this time because discontinuation may result in adverse effects of return or concerning behaviors/symptoms. 11.  Hyperlipidemia  ASA, statin. Routine lab monitoring. 12.  Hypocalcemia  Continue supplemental calcium and vitamin D. Routine lab monitoring. 13.  Attention to Colostomy  Diverting colostomy continues to drain brown liquid stool. Staff report no concerns. 14. Morbid obesity due to excess calories (HCC)  Appetite good.   Noncompliant with diabetic diet. Encourage patient to be oob in wheelchair on a daily basis. He continues to refuse. Disposition:  Assessment and plan as stated above. Follow-up per routine and as warranted. Plan of care reviewed by Dr. Arlyn Chowdhury DO.   Electronically signed by GAYATHRI Hawkins NP on 2/23/2021 at 10:15 AM

## 2021-03-15 ENCOUNTER — OUTSIDE SERVICES (OUTPATIENT)
Dept: FAMILY MEDICINE CLINIC | Age: 41
End: 2021-03-15
Payer: MEDICARE

## 2021-03-15 DIAGNOSIS — Z91.199 PERSONAL HISTORY OF NONCOMPLIANCE WITH MEDICAL TREATMENT, PRESENTING HAZARDS TO HEALTH: ICD-10-CM

## 2021-03-15 DIAGNOSIS — Z97.8 FOLEY CATHETER IN PLACE: ICD-10-CM

## 2021-03-15 DIAGNOSIS — I10 ESSENTIAL HYPERTENSION: Chronic | ICD-10-CM

## 2021-03-15 DIAGNOSIS — L89.154 STAGE IV PRESSURE ULCER OF SACRAL REGION (HCC): Chronic | ICD-10-CM

## 2021-03-15 DIAGNOSIS — E13.69 OTHER SPECIFIED DIABETES MELLITUS WITH OTHER SPECIFIED COMPLICATION, WITHOUT LONG-TERM CURRENT USE OF INSULIN (HCC): ICD-10-CM

## 2021-03-15 DIAGNOSIS — E11.69 TYPE 2 DIABETES MELLITUS WITH OTHER SPECIFIED COMPLICATION, WITHOUT LONG-TERM CURRENT USE OF INSULIN (HCC): Chronic | ICD-10-CM

## 2021-03-15 DIAGNOSIS — F33.1 MAJOR DEPRESSIVE DISORDER, RECURRENT EPISODE, MODERATE (HCC): ICD-10-CM

## 2021-03-15 DIAGNOSIS — F51.01 PRIMARY INSOMNIA: Chronic | ICD-10-CM

## 2021-03-15 DIAGNOSIS — N17.9 ACUTE RENAL FAILURE, UNSPECIFIED ACUTE RENAL FAILURE TYPE (HCC): ICD-10-CM

## 2021-03-15 DIAGNOSIS — E66.01 MORBID OBESITY DUE TO EXCESS CALORIES (HCC): Chronic | ICD-10-CM

## 2021-03-15 DIAGNOSIS — Z49.01 ENCOUNTER FOR DIALYSIS CATHETER CARE (HCC): ICD-10-CM

## 2021-03-15 DIAGNOSIS — Z99.2 DIALYSIS PATIENT (HCC): Primary | Chronic | ICD-10-CM

## 2021-03-15 DIAGNOSIS — Z93.3 COLOSTOMY STATUS (HCC): ICD-10-CM

## 2021-03-15 DIAGNOSIS — N50.89 SCROTAL EDEMA: ICD-10-CM

## 2021-03-15 DIAGNOSIS — G89.29 OTHER CHRONIC PAIN: ICD-10-CM

## 2021-03-15 DIAGNOSIS — R60.0 BILATERAL LEG EDEMA: ICD-10-CM

## 2021-03-15 PROCEDURE — 99309 SBSQ NF CARE MODERATE MDM 30: CPT | Performed by: NURSE PRACTITIONER

## 2021-03-16 NOTE — PROGRESS NOTES
[address] [facility] 5&20 Progress Note    NAME: Andreas Landry  DATE: 3/15/21  ROOM #: B 30-1  : 1980  REASON FOR VISIT: Other (routine visit)    CODE STATUS: Full Code    History obtained from chart review, the patient and staff. SUBJECTIVE:  HPI: Gema Tinoco is a 36 y.o. male. Patient has a PMH significant for:  Past Medical History:   Diagnosis Date    Diabetes ([address]     HTN (hypertension)     Insomnia     Morbid obesity ([address]     Pressure ulcer        Pt seen and examined at bedside today for routine visit and is noted to be in no acute distress. He reports feeling well and has no questions or concerns. Staff provide appropriate updates; appreciate staff input. I have reviewed patients past medical, surgical, social, and family history and have made updates where appropriate. Allergies and Medications were reviewed through the [facility] EMR.     Patient Active Problem List    Diagnosis Date Noted    COVID-19 2020    Dialysis patient [facility]) 10/28/2020    Acute kidney failure, unspecified ([address] 09/15/2020    Desir catheter in place 09/15/2020    Encounter for dialysis catheter care ([address] 09/15/2020    Cellulitis and abscess of right leg 2020    Personal history of noncompliance with medical treatment, presenting hazards to health 2020    Anasarca 2020    Diabetes mellitus ([address] 2020    Scrotal edema 2019    Bilateral leg edema 10/09/2019    Major depressive disorder, recurrent episode, moderate ([address] 2019    Colostomy status ([address] 2019    Generalized muscle weakness 2019    Other chronic pain 2019    Morbid obesity ([address]     Diabetes ([address]     Essential hypertension     Primary insomnia     Nursing home resident 2018    Morbid obesity due to excess calories ([address] 2018    Stage IV pressure ulcer of sacral region ([address] 2018     REVIEW OF SYSTEMS  Positive responses are highlighted in bold  Constitutional: Fever, Chills, Night Sweats, Fatigue, Unexpected changes in weight, Generalized Pain to coccyx wound   HENT:  Ear pain, Tinnitus, Nosebleeds, Trouble swallowing, Hearing loss, Sore throat  Cardiovascular:  Chest Pain, Palpitations, Orthopnea, Paroxysmal Nocturnal Dyspnea  Respiratory:  Cough, Wheezing, Shortness of breath, Chest tightness, Apnea  Gastrointestinal:  Nausea, Vomiting, Diarrhea, Constipation, Heartburn, Blood in stool  Genitourinary:  Difficulty or painful urination, Flank pain, Change in frequency, Urgency  Skin:  Color change, Rash, Itching, Wound  Musculoskeletal:  Joint pain, Back pain, Gait problems, Joint swelling, Myalgias  Neurological:  Dizziness, Headaches, Presyncope, Numbness, Seizures, Tremors  Endocrine:  Heat Intolerance, Cold Intolerance, Polydipsia, Polyphagia, Polyuria    OBJECTIVE:  PHYSICAL EXAM:  VS:  109/49, 96.3, 73, 18, 18, 101 BG, 94% 4 L Nasal Cannula   Weight in pounds:  He refuses in-house weights. Pt will be weighed with dialysis. 347.3 (was 429.9, 460.9, 473.0)  Pain: Generalized pain, but is now tolerable. VS reviewed. General Appearance: morbidly obese, debilitated. He requests to stay in bed at all times. Head: normocephalic and atraumatic  Eyes: conjunctivae and eye lids without erythema  ENT: external ear and ear canal normal bilaterally, nose without deformity, nasal mucosa and turbinates normal without polyps, oropharynx normal, dentition is normal for age, no lip or gum lesions noted  Neck: supple and non-tender without mass, no thyromegaly or thyroid nodules, no cervical lymphadenopathy  Pulmonary/Chest: clear but diminished to auscultation bilaterally- no wheezes, rales or rhonchi, normal air movement, no respiratory distress or retractions, requires no use of supplemental oxygen at time of assessment  Cardiovascular: normal rate, regular rhythm, normal S1 and S2, no murmurs, rubs, clicks, or gallops, distal pulses intact.   Right chest dialysis catheter is in place. Abdomen: obese abdomen is soft, non-tender, non-distended, bowel sounds physiologic,  no rebound or guarding, no masses or hernias noted. Liver and spleen without enlargement. Colostomy in LUQ  Genitourinary:  Indwelling soto catheter drains clear yellow urine. Extremities: no cyanosis, clubbing. Chronic, 1+ pitting edema to BLE. Musculoskeletal: No joint swelling or gross deformity; He is bed ridden. Neuro:  Alert and oriented, 4/5 strength to BUE. BLE with 0/5 strength, normal speech, no focal findings or movement disorder noted  Psych:  Normal affect without evidence of depression or anxiety, insight and judgement are intact, memory appears intact. He is routinely pleasant and communicates well with staff. Staff take good care of him. Skin: warm and dry, no rash or erythema;    ASSESSMENT & PLAN:    1. Acute kidney failure  Continue hemodialysis M-W-F. Encourage 1100 ml fluid restriction - he is requesting to stop fluid restriction. Will have to address with Dr. Rodger Vick, 0.5 mg po prn prior to dialysis appointments for anxiety. He says this helps with anxiety. He acknowledges that Dialysis is going \"better than he thought it would\". States they are considering a fistula. Appreciate staff encouragement and support. F/U with Dr. Mark Davison as indicated. 2.  Urinary retention  OK to maintain indwelling soto catheter for urinary retention due to edema. 3. Decubitus ulcer of sacral region, stage 4 ([facility] [address] [facility]  Continue wound care per Dr. Susan Davidson. He reports feeling well and denies fevers and chills. F/U with Dr. Susan Davidson as indicated. Appreciate Wound Nurse's input. Asking staff to continue to monitor. Continue MVI and Zinc supplements. 4.  COPD  Stable at time of assessment. Continue prn DuoNebs and supplemental oxygen as needed. 5. Essential hypertension  BP is at goal with Metoprolol 50 mg po every 8 hours.   Lisinopril was stopped for high potassium levels - has done well off Lisinopril. He denies chest pain, pressure, heaviness. No SOB. No episodes of hypotension or bradycardia. 6. Other specified diabetes mellitus with other specified complication, with long-term current use of insulin (HCC)  Metformin and Januvia were stopped due to poor kidney function. Continue sliding scale Humalog as ordered. Levemir 12 units SQ daily   A1C per routine. Continue carb controlled diet. Family is no longer bringing in fast food. 7. Other chronic pain  Continue prn Oxycodone and prn Tylenol. He reports wound pain with dressing changes. 8. Primary insomnia  Continue home dose of Ambien. Denies problems sleeping at night. 9.  Bilateral lower leg edema  Bilateral Edema has much improved. 1+ pitting edema BLE  He is improving with fluid restriction, in-house weights. Routine weights with Dialysis are improving. 10.  Major depressive disorder, recurrent. No SI/HI. He smiles and makes eye contact and engages in conversation. He is appreciative and reports dialysis is going well. He has declined consultation to Psych and Counseling services several times in the past.  Continue current dosage of Zoloft 100 mg po once daily. Considered decreasing dosage of Zoloft once he is up in his customized wheelchair more often but he refuses to be OOB. Antipsychotic/Antianxiety/Hypnotic/Psychotropic/Sedation/Antidepressant medications are continued at this time because discontinuation may result in adverse effects of return or concerning behaviors/symptoms. 11.  Hyperlipidemia  ASA, statin. Routine lab monitoring. 12.  Hypocalcemia  Continue supplemental calcium and vitamin D. Routine lab monitoring. 13.  Attention to Colostomy  Diverting colostomy continues to drain brown liquid stool. Staff report no concerns. 14. Morbid obesity due to excess calories (HCC)  Appetite good. Noncompliant with diabetic diet.   Encourage patient to be oob in wheelchair on a daily basis. He continues to refuse. Disposition:  Assessment and plan as stated above. Follow-up per routine and as warranted. Plan of care reviewed by Dr. Real Tai DO.   Electronically signed by GAYATHRI Rodriguez NP on 3/16/2021 at 4:47 PM

## 2021-05-06 ENCOUNTER — OUTSIDE SERVICES (OUTPATIENT)
Dept: FAMILY MEDICINE CLINIC | Age: 41
End: 2021-05-06
Payer: MEDICARE

## 2021-05-06 DIAGNOSIS — R23.8 SKIN IRRITATION: Primary | ICD-10-CM

## 2021-05-06 PROCEDURE — 99307 SBSQ NF CARE SF MDM 10: CPT | Performed by: NURSE PRACTITIONER

## 2021-05-06 NOTE — PROGRESS NOTES
[address] Progress Note    NAME: Clemente Landry  DATE: 21  ROOM #: B 30-1  : 1980  REASON FOR VISIT: Other (f/u visit for dialysis port site redness)    CODE STATUS: Full Code    History obtained from chart review, the patient and staff. SUBJECTIVE:  HPI: Esthela Hickey is a 36 y.o. male. Patient has a PMH significant for:  Past Medical History:   Diagnosis Date    Diabetes ([address]     HTN (hypertension)     Insomnia     Morbid obesity ([address]     Pressure ulcer        Pt seen and examined at bedside today for f/u to irritation to dialysis port insertion site. He is in bed at time of assessment and is noted to be in no acute distress. He reports feeling well and has no questions or concerns. Staff provide appropriate updates; appreciate staff input. I have reviewed patients past medical, surgical, social, and family history and have made updates where appropriate. Allergies and Medications were reviewed through the [facility] EMR.     Patient Active Problem List    Diagnosis Date Noted    COVID-19 2020    Dialysis patient [facility]) 10/28/2020    Acute kidney failure, unspecified ([address] 09/15/2020    Desir catheter in place 09/15/2020    Encounter for dialysis catheter care ([address] 09/15/2020    Cellulitis and abscess of right leg 2020    Personal history of noncompliance with medical treatment, presenting hazards to health 2020    Anasarca 2020    Diabetes mellitus ([address] 2020    Scrotal edema 2019    Bilateral leg edema 10/09/2019    Major depressive disorder, recurrent episode, moderate ([address] 2019    Colostomy status ([address] 2019    Generalized muscle weakness 2019    Other chronic pain 2019    Morbid obesity ([address]     Diabetes ([address]     Essential hypertension     Primary insomnia     Nursing home resident 2018    Morbid obesity due to excess calories ([address] 2018    Stage IV pressure ulcer of sacral region ([address] 11/08/2018     REVIEW OF SYSTEMS  Positive responses are highlighted in bold  Constitutional:  Fever, Chills, Night Sweats, Fatigue, Unexpected changes in weight, Generalized Pain to coccyx wound   HENT:  Ear pain, Tinnitus, Nosebleeds, Trouble swallowing, Hearing loss, Sore throat  Cardiovascular:  Chest Pain, Palpitations, Orthopnea, Paroxysmal Nocturnal Dyspnea  Respiratory:  Cough, Wheezing, Shortness of breath, Chest tightness, Apnea  Gastrointestinal:  Nausea, Vomiting, Diarrhea, Constipation, Heartburn, Blood in stool  Genitourinary:  Difficulty or painful urination, Flank pain, Change in frequency, Urgency  Skin:  Color change, Rash, Itching, Wound  Musculoskeletal:  Joint pain, Back pain, Gait problems, Joint swelling, Myalgias  Neurological:  Dizziness, Headaches, Presyncope, Numbness, Seizures, Tremors  Endocrine:  Heat Intolerance, Cold Intolerance, Polydipsia, Polyphagia, Polyuria    OBJECTIVE:  PHYSICAL EXAM:  VS:  136/77, 96.7, 82, 18, 219 blood glucose, 91% on room air  Weight in pounds:  339.9 (was 347.3, 429.9, 460.9, 473.0)  Pain: Generalized pain, but is now tolerable. VS reviewed. General Appearance: morbidly obese, debilitated. He appears well today. Head: normocephalic and atraumatic  Eyes: conjunctivae and eye lids without erythema  ENT: external ear and ear canal normal bilaterally, nose without deformity, nasal mucosa and turbinates normal without polyps, oropharynx normal, dentition is normal for age, no lip or gum lesions noted  Neck: supple and non-tender without mass, no thyromegaly or thyroid nodules, no cervical lymphadenopathy  Pulmonary/Chest: clear but diminished to auscultation bilaterally- no wheezes, rales or rhonchi, normal air movement, no respiratory distress or retractions, requires no use of supplemental oxygen at time of assessment  Cardiovascular: normal rate, regular rhythm, normal S1 and S2, no murmurs, rubs, clicks, or gallops, distal pulses intact.   Right chest dialysis catheter is in place. Abdomen: obese abdomen is soft, non-tender, non-distended, bowel sounds physiologic,  no rebound or guarding, no masses or hernias noted. Liver and spleen without enlargement. Colostomy in LUQ  Genitourinary:  Indwelling soto catheter drains clear yellow urine. Extremities: no cyanosis, clubbing. Chronic, 1+ pitting edema to BLE. Musculoskeletal: No joint swelling or gross deformity; He is bed ridden. Neuro:  Alert and oriented, 4/5 strength to BUE. BLE with 0/5 strength, normal speech, no focal findings or movement disorder noted  Psych:  Normal affect without evidence of depression or anxiety, insight and judgement are intact, memory appears intact. He is routinely pleasant and communicates well with staff. Staff take good care of him. Skin: warm and dry, no rash or erythema;    ASSESSMENT & PLAN:    1. Skin irritation  Skin surrounding Right chest hemodialysis port site was red and irritated earlier this week. Hemodialysis staff was updated and initiated gentamycin cream 0.01% to site. Today, site looks well. No redness, edema, warmth, irritation, s/s of infection. Nayeli Fisher reports that the site looks much better today. He denies pain to site, fever, chills. DSD is intact over site. Hemodialysis port is maintained. Continue to monitor. Disposition:  Assessment and plan as stated above. Follow-up per routine and as warranted. Plan of care reviewed by Dr. Diana Encarnacion DO.   Electronically signed by GAYATHRI Eaton NP on 5/6/2021 at 3:55 PM

## 2021-05-27 VITALS
DIASTOLIC BLOOD PRESSURE: 73 MMHG | SYSTOLIC BLOOD PRESSURE: 135 MMHG | HEART RATE: 77 BPM | RESPIRATION RATE: 18 BRPM | TEMPERATURE: 97.9 F | WEIGHT: 315 LBS

## 2021-05-27 ASSESSMENT — ENCOUNTER SYMPTOMS
SORE THROAT: 0
ABDOMINAL PAIN: 0
COUGH: 0
BACK PAIN: 0
DIARRHEA: 0
SHORTNESS OF BREATH: 0
NAUSEA: 0
CONSTIPATION: 0
WHEEZING: 0
VOMITING: 0

## 2021-05-27 NOTE — PROGRESS NOTES
Alison Hanna is a 36 y.o. male that is being seen at [facility] for Other (Chronic pain)      Alison Hanna  1980 5/27/21      HPI:  Patient seen and examined at bedside. History obtain from patient and chart. Patient states that he is doing well, denies any acute issues. Denies CP, SOB, Constipation. Eating well. Has lost over 100 lbs since starting HD. I have reviewed the patient's past medical history, past surgical history, allergies,medications, social and family history and I have made updates where appropriate. Past Medical History:   Diagnosis Date    Diabetes ([address]     HTN (hypertension)     Insomnia     Morbid obesity (HCC)     Pressure ulcer        No past surgical history on file. Social History     Tobacco Use    Smoking status: Not on file   Substance Use Topics    Alcohol use: Not on file    Drug use: Not on file       No family history on file. Review of Systems   Constitutional: Negative for chills and fever. HENT: Negative for hearing loss and sore throat. Respiratory: Negative for cough, shortness of breath and wheezing. Cardiovascular: Negative for chest pain and palpitations. Gastrointestinal: Negative for abdominal pain, constipation, diarrhea, nausea and vomiting. Genitourinary: Negative for dysuria and hematuria. Musculoskeletal: Negative for back pain. Skin: Positive for wound. Neurological: Negative for headaches. PHYSICAL EXAM:    /73   Pulse 77   Temp 97.9 °F (36.6 °C)   Resp 18   Wt (!) 340 lb (154.2 kg)       Physical Exam  Vitals and nursing note reviewed. Constitutional:       General: He is not in acute distress. Appearance: He is well-developed. HENT:      Head: Normocephalic and atraumatic. Right Ear: Hearing and external ear normal.      Left Ear: Hearing and external ear normal.      Nose: Nose normal.   Eyes:      General:         Right eye: No discharge. Left eye: No discharge.

## 2021-05-28 ENCOUNTER — OUTSIDE SERVICES (OUTPATIENT)
Dept: FAMILY MEDICINE CLINIC | Age: 41
End: 2021-05-28
Payer: MEDICARE

## 2021-05-28 DIAGNOSIS — E66.01 MORBID OBESITY DUE TO EXCESS CALORIES (HCC): Chronic | ICD-10-CM

## 2021-05-28 DIAGNOSIS — F51.01 PRIMARY INSOMNIA: Chronic | ICD-10-CM

## 2021-05-28 DIAGNOSIS — L89.154 STAGE IV PRESSURE ULCER OF SACRAL REGION (HCC): Chronic | ICD-10-CM

## 2021-05-28 DIAGNOSIS — I10 ESSENTIAL HYPERTENSION: Chronic | ICD-10-CM

## 2021-05-28 DIAGNOSIS — Z99.2 DIALYSIS PATIENT (HCC): Chronic | ICD-10-CM

## 2021-05-28 DIAGNOSIS — E11.69 TYPE 2 DIABETES MELLITUS WITH OTHER SPECIFIED COMPLICATION, WITHOUT LONG-TERM CURRENT USE OF INSULIN (HCC): Primary | Chronic | ICD-10-CM

## 2021-05-28 PROCEDURE — 99309 SBSQ NF CARE MODERATE MDM 30: CPT | Performed by: FAMILY MEDICINE

## 2021-06-08 ENCOUNTER — OUTSIDE SERVICES (OUTPATIENT)
Dept: FAMILY MEDICINE CLINIC | Age: 41
End: 2021-06-08
Payer: MEDICARE

## 2021-06-08 DIAGNOSIS — E13.69 OTHER SPECIFIED DIABETES MELLITUS WITH OTHER SPECIFIED COMPLICATION, WITHOUT LONG-TERM CURRENT USE OF INSULIN (HCC): ICD-10-CM

## 2021-06-08 DIAGNOSIS — F33.1 MAJOR DEPRESSIVE DISORDER, RECURRENT EPISODE, MODERATE (HCC): ICD-10-CM

## 2021-06-08 DIAGNOSIS — L89.154 STAGE IV PRESSURE ULCER OF SACRAL REGION (HCC): Chronic | ICD-10-CM

## 2021-06-08 DIAGNOSIS — F51.01 PRIMARY INSOMNIA: Chronic | ICD-10-CM

## 2021-06-08 DIAGNOSIS — E66.01 MORBID OBESITY DUE TO EXCESS CALORIES (HCC): Chronic | ICD-10-CM

## 2021-06-08 DIAGNOSIS — I10 ESSENTIAL HYPERTENSION: Primary | Chronic | ICD-10-CM

## 2021-06-08 DIAGNOSIS — G89.29 OTHER CHRONIC PAIN: ICD-10-CM

## 2021-06-08 DIAGNOSIS — Z99.2 DIALYSIS PATIENT (HCC): Chronic | ICD-10-CM

## 2021-06-08 PROCEDURE — 99309 SBSQ NF CARE MODERATE MDM 30: CPT | Performed by: NURSE PRACTITIONER

## 2021-06-08 NOTE — PROGRESS NOTES
[address] Progress Note    NAME: Allie Landry  DATE: 21  ROOM #: B 30-1  : 1980  REASON FOR VISIT: Other (routine visit)    CODE STATUS: Full Code    History obtained from chart review, the patient and staff. SUBJECTIVE:  HPI: Bertha Linder is a 36 y.o. male. Patient has a PMH significant for:  Past Medical History:   Diagnosis Date    Diabetes ([address]     HTN (hypertension)     Insomnia     Morbid obesity ([address]     Pressure ulcer        Pt seen and examined at bedside today for routine visit and is noted to be in no acute distress. He reports feeling well and has no questions or concerns. Staff provide appropriate updates; appreciate staff input. I have reviewed patients past medical, surgical, social, and family history and have made updates where appropriate. Allergies and Medications were reviewed through the [facility] EMR.     Patient Active Problem List    Diagnosis Date Noted    COVID-19 2020    Dialysis patient [facility]) 10/28/2020    Acute kidney failure, unspecified ([address] 09/15/2020    Desir catheter in place 09/15/2020    Encounter for dialysis catheter care ([address] 09/15/2020    Cellulitis and abscess of right leg 2020    Personal history of noncompliance with medical treatment, presenting hazards to health 2020    Anasarca 2020    Diabetes mellitus ([address] 2020    Scrotal edema 2019    Bilateral leg edema 10/09/2019    Major depressive disorder, recurrent episode, moderate ([address] 2019    Colostomy status ([address] 2019    Generalized muscle weakness 2019    Other chronic pain 2019    Morbid obesity ([address]     Diabetes ([address]     Essential hypertension     Primary insomnia     Nursing home resident 2018    Morbid obesity due to excess calories ([address] 2018    Stage IV pressure ulcer of sacral region ([address] 2018     REVIEW OF SYSTEMS  Positive responses are highlighted in bold  Constitutional: Fever, Chills, Night Sweats, Fatigue, Unexpected changes in weight, Generalized Pain to coccyx wound   HENT:  Ear pain, Tinnitus, Nosebleeds, Trouble swallowing, Hearing loss, Sore throat  Cardiovascular:  Chest Pain, Palpitations, Orthopnea, Paroxysmal Nocturnal Dyspnea  Respiratory:  Cough, Wheezing, Shortness of breath, Chest tightness, Apnea  Gastrointestinal:  Nausea, Vomiting, Diarrhea, Constipation, Heartburn, Blood in stool  Genitourinary:  Difficulty or painful urination, Flank pain, Change in frequency, Urgency  Skin:  Color change, Rash, Itching, Wound  Musculoskeletal:  Joint pain, Back pain, Gait problems, Joint swelling, Myalgias  Neurological:  Dizziness, Headaches, Presyncope, Numbness, Seizures, Tremors  Endocrine:  Heat Intolerance, Cold Intolerance, Polydipsia, Polyphagia, Polyuria    OBJECTIVE:  PHYSICAL EXAM:  VS:  113/76, 97.3, 75, 18, 233 blood glucose, 96% on supplemental oxygen. Weight in pounds:  338.4 (was 347.3, 429.9, 460.9, 473.0)  Pain: Generalized pain is controlled with current pain medication. VS reviewed. General Appearance: morbidly obese, debilitated. He is up to his wheelchair at times. Head: normocephalic and atraumatic  Eyes: conjunctivae and eye lids without erythema  ENT: external ear and ear canal normal bilaterally, nose without deformity, nasal mucosa and turbinates normal without polyps, oropharynx normal, dentition is normal for age, no lip or gum lesions noted  Neck: supple and non-tender without mass, no thyromegaly or thyroid nodules, no cervical lymphadenopathy  Pulmonary/Chest: clear but diminished to auscultation bilaterally- no wheezes, rales or rhonchi, normal air movement, no respiratory distress or retractions, requires chronic use of supplemental oxygen  Cardiovascular: normal rate, regular rhythm, normal S1 and S2, no murmurs, rubs, clicks, or gallops, distal pulses intact. Right chest dialysis catheter is in place.   Abdomen: obese abdomen is soft, non-tender, non-distended, bowel sounds physiologic,  no rebound or guarding, no masses or hernias noted. Liver and spleen without enlargement. Colostomy in LUQ  Genitourinary:  Makes little or no urine. Extremities: no cyanosis, clubbing. Chronic, generalized edema to BLE. Musculoskeletal: No joint swelling or gross deformity. Neuro:  Alert and oriented, 4/5 strength to BUE. BLE with 0/5 strength, normal speech, no focal findings or movement disorder noted  Psych:  Normal affect without evidence of depression or anxiety, insight and judgement are intact, memory appears intact. He is routinely pleasant and communicates well with staff. Staff take good care of him. Skin: warm and dry, no rash or erythema;    ASSESSMENT & PLAN:    1. Acute kidney failure  Continue hemodialysis M-W-F. Current with Dr. Shellie Bishop. Continue Ativan, 0.5 mg po prn prior to dialysis appointments for anxiety. He says this helps with anxiety. He acknowledges that Dialysis is going \"better than he thought it would\". States they are considering a fistula. Appreciate staff encouragement and support. 2.  Urinary retention  OK to maintain indwelling soto catheter for urinary retention due to edema. 3. Decubitus ulcer of sacral region, stage 4 (HCC)  Wound is chronic and non-healing. Continue wound care per Dr. Son Acosta. Cipro and Cefadroxil were started by Dr. Son Acosta for Acinterobacter, E coli and Streptococcus in his wound. He reports feeling well and denies fevers and chills. Appreciate Wound Nurse's input. Asking staff to continue to monitor. Continue MVI and Zinc supplements. 4.  COPD  Stable at time of assessment. Continue prn DuoNebs and supplemental oxygen as needed. 5. Essential hypertension  BP is at goal with Metoprolol 50 mg po every 8 hours. Lisinopril was stopped for high potassium levels - has done well off Lisinopril. He denies chest pain, pressure, heaviness. No SOB.   No episodes of hypotension or bradycardia. 6. Other specified diabetes mellitus with other specified complication, with long-term current use of insulin (HCC)  Metformin and Januvia were stopped due to poor kidney function. Continue sliding scale Humalog as ordered. Levemir 12 units SQ daily   A1C per routine. Continue carb controlled diet. Family is no longer bringing in fast food. 7. Other chronic pain  Continue prn Oxycodone and prn Tylenol. He reports wound pain with dressing changes. 8. Primary insomnia  Continue home dose of Ambien. Denies problems sleeping at night. 9.  Bilateral lower leg edema  Bilateral Edema has much improved. Generalized edema to BLE  Routine weights with Dialysis are improving. 10.  Major depressive disorder, recurrent. No SI/HI. He smiles and makes eye contact and engages in conversation. He is appreciative and reports dialysis is going well. He has declined consultation to Psych and Counseling services several times in the past.  Continue current dosage of Zoloft 100 mg po once daily. Considered decreasing dosage of Zoloft once he is up in his customized wheelchair more often but he refuses to be OOB. Antipsychotic/Antianxiety/Hypnotic/Psychotropic/Sedation/Antidepressant medications are continued at this time because discontinuation may result in adverse effects of return or concerning behaviors/symptoms. 11.  Hyperlipidemia  ASA, statin. Routine lab monitoring. 12.  Hypocalcemia  Continue supplemental calcium and vitamin D. Routine lab monitoring. 13.  Attention to Colostomy  Diverting colostomy continues to drain brown liquid stool. Staff report no concerns. 14. Morbid obesity due to excess calories (HCC)  Appetite good. Noncompliant with diabetic diet. Encourage patient to be oob in wheelchair on a daily basis. He continues to refuse. Disposition:  Assessment and plan as stated above. Follow-up per routine and as warranted.     Plan of care reviewed by Dr. Rj Patel [id number].   Electronically signed by GAYATHRI Lamb NP on 6/8/2021 at 4:21 PM

## 2021-07-21 ENCOUNTER — OUTSIDE SERVICES (OUTPATIENT)
Dept: FAMILY MEDICINE CLINIC | Age: 41
End: 2021-07-21
Payer: MEDICARE

## 2021-07-21 DIAGNOSIS — M62.81 GENERALIZED MUSCLE WEAKNESS: ICD-10-CM

## 2021-07-21 DIAGNOSIS — F33.1 MAJOR DEPRESSIVE DISORDER, RECURRENT EPISODE, MODERATE (HCC): ICD-10-CM

## 2021-07-21 DIAGNOSIS — I10 ESSENTIAL HYPERTENSION: Primary | Chronic | ICD-10-CM

## 2021-07-21 DIAGNOSIS — L89.154 STAGE IV PRESSURE ULCER OF SACRAL REGION (HCC): Chronic | ICD-10-CM

## 2021-07-21 DIAGNOSIS — G89.29 OTHER CHRONIC PAIN: ICD-10-CM

## 2021-07-21 PROCEDURE — 99309 SBSQ NF CARE MODERATE MDM 30: CPT | Performed by: NURSE PRACTITIONER

## 2021-07-21 NOTE — PROGRESS NOTES
[address] [facility] 5&20 Progress Note    NAME: Tyler Landry  DATE: 21  ROOM #: B 30-1  : 1980  REASON FOR VISIT: Other (routine visit)    CODE STATUS: Full Code    History obtained from chart review, the patient and staff. SUBJECTIVE:  HPI: Kanchan Sampson is a 39 y.o. male. Patient has a PMH significant for:  Past Medical History:   Diagnosis Date    Diabetes ([address]     HTN (hypertension)     Insomnia     Morbid obesity ([address]     Pressure ulcer        Pt seen and examined at bedside today for routine visit and is noted to be in no acute distress. He reports feeling well and has no questions or concerns. Staff provide appropriate updates; appreciate staff input. I have reviewed patients past medical, surgical, social, and family history and have made updates where appropriate. Allergies and Medications were reviewed through the [facility] EMR.     Patient Active Problem List    Diagnosis Date Noted    COVID-19 2020    Dialysis patient [facility]) 10/28/2020    Acute kidney failure, unspecified ([address] 09/15/2020    Desir catheter in place 09/15/2020    Encounter for dialysis catheter care ([address] 09/15/2020    Cellulitis and abscess of right leg 2020    Personal history of noncompliance with medical treatment, presenting hazards to health 2020    Anasarca 2020    Diabetes mellitus ([address] 2020    Scrotal edema 2019    Bilateral leg edema 10/09/2019    Major depressive disorder, recurrent episode, moderate ([address] 2019    Colostomy status ([address] 2019    Generalized muscle weakness 2019    Other chronic pain 2019    Morbid obesity ([address]     Diabetes ([address]     Essential hypertension     Primary insomnia     Nursing home resident 2018    Morbid obesity due to excess calories ([address] 2018    Stage IV pressure ulcer of sacral region ([address] 2018     REVIEW OF SYSTEMS  Positive responses are highlighted in bold  Constitutional: Fever, Chills, Night Sweats, Fatigue, Unexpected changes in weight, Generalized Pain to coccyx wound   HENT:  Ear pain, Tinnitus, Nosebleeds, Trouble swallowing, Hearing loss, Sore throat  Cardiovascular:  Chest Pain, Palpitations, Orthopnea, Paroxysmal Nocturnal Dyspnea  Respiratory:  Cough, Wheezing, Shortness of breath, Chest tightness, Apnea  Gastrointestinal:  Nausea, Vomiting, Diarrhea, Constipation, Heartburn, Blood in stool  Genitourinary:  Difficulty or painful urination, Flank pain, Change in frequency, Urgency  Skin:  Color change, Rash, Itching, Wound  Musculoskeletal:  Joint pain, Back pain, Gait problems, Joint swelling, Myalgias  Neurological:  Dizziness, Headaches, Presyncope, Numbness, Seizures, Tremors  Endocrine:  Heat Intolerance, Cold Intolerance, Polydipsia, Polyphagia, Polyuria    OBJECTIVE:  PHYSICAL EXAM:  VS:  121/79, 97.4, 94, 18, 217 blood glucose, 95% on supplemental oxygen. Weight in pounds:  334.4 (was 338.4, 347.3, 429.9, 460.9, 473.0)  Pain: Generalized pain is controlled with current pain medication. VS reviewed. General Appearance: morbidly obese, debilitated. Head: normocephalic and atraumatic  Eyes: conjunctivae and eye lids without erythema  ENT: external ear and ear canal normal bilaterally, nose without deformity, nasal mucosa and turbinates normal without polyps, oropharynx normal, dentition is normal for age, no lip or gum lesions noted  Neck: supple and non-tender without mass, no thyromegaly or thyroid nodules, no cervical lymphadenopathy  Pulmonary/Chest: clear but diminished to auscultation bilaterally- no wheezes, rales or rhonchi, normal air movement, no respiratory distress or retractions, requires chronic use of supplemental oxygen  Cardiovascular: normal rate, regular rhythm, normal S1 and S2, no murmurs, rubs, clicks, or gallops, distal pulses intact. Right chest dialysis catheter is in place.   Abdomen: obese abdomen is soft, non-tender, non-distended, bowel sounds physiologic,  no rebound or guarding, no masses or hernias noted. Liver and spleen without enlargement. Colostomy in LUQ drains formed stool. Genitourinary:  Makes little or no urine. Extremities: no cyanosis, clubbing. Chronic, generalized edema to BLE is resolved. Musculoskeletal: No joint swelling or gross deformity. He prefers to be in bed most of the time but has been getting up to his motorized wheelchair occasionally. Neuro:  Alert and oriented, 4/5 strength to BUE. BLE with 0/5 strength, normal speech, no focal findings or movement disorder noted  Psych:  Normal affect without evidence of depression or anxiety, insight and judgement are intact, memory appears intact. He is routinely pleasant and communicates well with staff. Staff take good care of him. Skin: warm and dry, no rash or erythema;    LABS  6/1/21:  WBC 9.1, Hgb 9.3, platelets 637.  7/0/72:  Na 135, K 5.1, chl 97, CO2 31, BUN 28, Creat 2.2, GFR 33, calcium 9.2.  3/1/21:  Cholesterol 96, triglyc 160, HDL 25, LDL 39,   3/1/21:  Vitamin D 34. ASSESSMENT & PLAN:    1. Acute kidney failure  Continue hemodialysis M-W-F. Current with Dr. Liban Bethea. Continue Ativan, 0.5 mg po prn prior to dialysis appointments for anxiety. He says this helps a great deal.  He acknowledges that Dialysis is going \"better than he thought it would\". Appreciate staff encouragement and support. 2.  Urinary retention  OK to maintain indwelling soto catheter for urinary retention due to edema. 3. Decubitus ulcer of sacral region, stage 4 (HCC)  Wound is chronic and non-healing. Continue wound care per Dr. Bibiana Gracia. In-house Wound Nurse is on board and reports his wound is stable. He completed a course of Cipro and Cefadroxil started by Dr. Bibiana Gracia for Acinterobacter, E coli and Streptococcus in wound. He reports feeling well and denies fevers and chills. Continue MVI and Zinc supplements. 4.  COPD  Stable at time of assessment.   Continue prn obesity due to excess calories (HCC)  Appetite good. Noncompliant with diabetic diet. Encourage patient to be oob in wheelchair on a daily basis. Disposition:  Assessment and plan as stated above. Follow-up per routine and as warranted.     Plan of care reviewed by Dr. Ander Gonzalez MD.  Electronically signed by GAYATHRI Covington NP on 7/21/2021 at 2:25 PM

## 2021-08-04 ENCOUNTER — OUTSIDE SERVICES (OUTPATIENT)
Dept: FAMILY MEDICINE CLINIC | Age: 41
End: 2021-08-04
Payer: MEDICARE

## 2021-08-04 DIAGNOSIS — Z99.2 DIALYSIS PATIENT (HCC): Chronic | ICD-10-CM

## 2021-08-04 DIAGNOSIS — E13.69 OTHER SPECIFIED DIABETES MELLITUS WITH OTHER SPECIFIED COMPLICATION, WITHOUT LONG-TERM CURRENT USE OF INSULIN (HCC): Primary | ICD-10-CM

## 2021-08-04 PROCEDURE — 99308 SBSQ NF CARE LOW MDM 20: CPT | Performed by: NURSE PRACTITIONER

## 2021-08-07 NOTE — PROGRESS NOTES
[address] 5[address]20 Progress Note    NAME: Chandler Landry  DATE: 21  ROOM #: B 30-1  : 1980  REASON FOR VISIT: Other (f/u visit for anxiety with dialysis and blood glucose levels)    CODE STATUS: Full Code    History obtained from chart review, the patient and staff. SUBJECTIVE:  HPI: Shari Torre is a 39 y.o. male. Patient has a PMH significant for:  Past Medical History:   Diagnosis Date    Diabetes ([address]     HTN (hypertension)     Insomnia     Morbid obesity ([address]     Pressure ulcer        Pt seen and examined at bedside today to discuss anxiety with hemodialysis and elevated blood glucose levels. He is in bed at time of assessment and reports feeling well. He has appropriate questions and no concerns. He tells me that he appreciates the care that Nursing Assistants Darcie Patterson and Desiree Bar provide to him. Staff provide appropriate updates; appreciate staff input. I have reviewed patients past medical, surgical, social, and family history and have made updates where appropriate. Allergies and Medications were reviewed through the [facility] EMR.     Patient Active Problem List    Diagnosis Date Noted    COVID-19 2020    Dialysis patient [facility]) 10/28/2020    Acute kidney failure, unspecified ([facility] 09/15/2020    Desir catheter in place 09/15/2020    Encounter for dialysis catheter care ([facility] 09/15/2020    Cellulitis and abscess of right leg 2020    Personal history of noncompliance with medical treatment, presenting hazards to health 2020    Anasarca 2020    Diabetes mellitus ([address] 2020    Scrotal edema 2019    Bilateral leg edema 10/09/2019    Major depressive disorder, recurrent episode, moderate ([address] 2019    Colostomy status ([address] 2019    Generalized muscle weakness 2019    Other chronic pain 2019    Morbid obesity ([address]     Diabetes ([address]     Essential hypertension     Primary insomnia     Nursing home resident 12/29/2018    Morbid obesity due to excess calories ([facility] 11/08/2018    Stage IV pressure ulcer of sacral region ([facility] 11/08/2018     REVIEW OF SYSTEMS  Positive responses are highlighted in bold  Constitutional:  Fever, Chills, Night Sweats, Fatigue, Unexpected changes in weight, Generalized Pain to coccyx wound   HENT:  Ear pain, Tinnitus, Nosebleeds, Trouble swallowing, Hearing loss, Sore throat  Cardiovascular:  Chest Pain, Palpitations, Orthopnea, Paroxysmal Nocturnal Dyspnea  Respiratory:  Cough, Wheezing, Shortness of breath, Chest tightness, Apnea  Gastrointestinal:  Nausea, Vomiting, Diarrhea, Constipation, Heartburn, Blood in stool  Genitourinary:  Difficulty or painful urination, Flank pain, Change in frequency, Urgency  Skin:  Color change, Rash, Itching, Wound  Musculoskeletal:  Joint pain, Back pain, Gait problems, Joint swelling, Myalgias  Neurological:  Dizziness, Headaches, Presyncope, Numbness, Seizures, Tremors  Endocrine:  Heat Intolerance, Cold Intolerance, Polydipsia, Polyphagia, Polyuria    OBJECTIVE:  PHYSICAL EXAM:  VS:  119/69, 97.3, 99, 18, 255 blood glucose, 97% on room air  Weight in pounds:  334.1 (was 338.4, 347.3, 429.9, 460.9, 473.0)  Pain: Generalized pain is controlled with current pain medication. VS reviewed. General Appearance: morbidly obese, debilitated.   Head: normocephalic and atraumatic  Eyes: conjunctivae and eye lids without erythema  ENT: external ear and ear canal normal bilaterally, nose without deformity, nasal mucosa and turbinates normal without polyps, oropharynx normal, dentition is normal for age, no lip or gum lesions noted  Neck: supple and non-tender without mass, no thyromegaly or thyroid nodules, no cervical lymphadenopathy  Pulmonary/Chest: clear but diminished to auscultation bilaterally- no wheezes, rales or rhonchi, normal air movement, no respiratory distress or retractions, requires chronic use of supplemental oxygen  Cardiovascular: normal rate, poor kidney function. Continue sliding scale Humalog as ordered. I increased Levemir 12 units SQ daily due to elevated blood glucose levels routinely > 200. Staff tell me that his appetite is increased and he is less compliant with diabetic diet. A1C per routine. Disposition:  Assessment and plan as stated above. Follow-up per routine and as warranted.     Plan of care reviewed by Dr. Chester Corona MD.  Electronically signed by GAYATHRI Arguelles NP on 8/7/2021 at 5:40 PM

## 2021-08-09 ENCOUNTER — OUTSIDE SERVICES (OUTPATIENT)
Dept: FAMILY MEDICINE CLINIC | Age: 41
End: 2021-08-09
Payer: MEDICARE

## 2021-08-09 DIAGNOSIS — E13.69 OTHER SPECIFIED DIABETES MELLITUS WITH OTHER SPECIFIED COMPLICATION, WITHOUT LONG-TERM CURRENT USE OF INSULIN (HCC): ICD-10-CM

## 2021-08-09 DIAGNOSIS — Z99.2 DIALYSIS PATIENT (HCC): Chronic | ICD-10-CM

## 2021-08-09 DIAGNOSIS — Z49.01 ENCOUNTER FOR DIALYSIS CATHETER CARE (HCC): ICD-10-CM

## 2021-08-09 DIAGNOSIS — G89.29 OTHER CHRONIC PAIN: Primary | ICD-10-CM

## 2021-08-09 PROCEDURE — 99309 SBSQ NF CARE MODERATE MDM 30: CPT | Performed by: NURSE PRACTITIONER

## 2021-08-09 NOTE — PROGRESS NOTES
[address] Progress Note    NAME: Tyler aLndry  DATE: 21  ROOM #: B 30-1  : 1980  REASON FOR VISIT: Other (f/u visit for anxiety, pain and elevated blood glucose levels)    CODE STATUS: Full Code    History obtained from chart review, the patient and staff. SUBJECTIVE:  HPI: Kanchan Sampson is a 39 y.o. male. Patient has a PMH significant for:  Past Medical History:   Diagnosis Date    Diabetes ([address]     HTN (hypertension)     Insomnia     Morbid obesity ([address]     Pressure ulcer        Pt seen and examined at bedside today to discuss anxiety with hemodialysis, chronic pain and elevated blood glucose levels. He is in bed at time of assessment and reports feeling well. He has appropriate questions and no concerns. Staff provide appropriate updates; appreciate staff input. I have reviewed patients past medical, surgical, social, and family history and have made updates where appropriate. Allergies and Medications were reviewed through the [facility] EMR.     Patient Active Problem List    Diagnosis Date Noted    COVID-19 2020    Dialysis patient [facility]) 10/28/2020    Acute kidney failure, unspecified ([address] 09/15/2020    Desir catheter in place 09/15/2020    Encounter for dialysis catheter care ([address] 09/15/2020    Cellulitis and abscess of right leg 2020    Personal history of noncompliance with medical treatment, presenting hazards to health 2020    Anasarca 2020    Diabetes mellitus ([address] 2020    Scrotal edema 2019    Bilateral leg edema 10/09/2019    Major depressive disorder, recurrent episode, moderate ([address] 2019    Colostomy status ([address] 2019    Generalized muscle weakness 2019    Other chronic pain 2019    Morbid obesity ([address]     Diabetes ([address]     Essential hypertension     Primary insomnia     Nursing home resident 2018    Morbid obesity due to excess calories ([address] 2018    Stage [facility] 11/08/2018     REVIEW OF SYSTEMS  Positive responses are highlighted in bold  Constitutional:  Fever, Chills, Night Sweats, Fatigue, Unexpected changes in weight, Generalized Pain to coccyx wound; anxiety with dialysis  HENT:  Ear pain, Tinnitus, Nosebleeds, Trouble swallowing, Hearing loss, Sore throat  Cardiovascular:  Chest Pain, Palpitations, Orthopnea, Paroxysmal Nocturnal Dyspnea  Respiratory:  Cough, Wheezing, Shortness of breath, Chest tightness, Apnea  Gastrointestinal:  Nausea, Vomiting, Diarrhea, Constipation, Heartburn, Blood in stool  Genitourinary:  Difficulty or painful urination, Flank pain, Change in frequency, Urgency  Skin:  Color change, Rash, Itching, Wound  Musculoskeletal:  Joint pain, Back pain, Gait problems, Joint swelling, Myalgias  Neurological:  Dizziness, Headaches, Presyncope, Numbness, Seizures, Tremors  Endocrine:  Heat Intolerance, Cold Intolerance, Polydipsia, Polyphagia, Polyuria    OBJECTIVE:  PHYSICAL EXAM:  VS:  119/69, 97.4, 99, 18, 208 blood glucose, 94%  Weight in pounds:  334.1 (was 338.4, 347.3, 429.9, 460.9, 473.0)  Pain: As described below. VS reviewed. General Appearance: morbidly obese, debilitated. Head: normocephalic and atraumatic  Eyes: conjunctivae and eye lids without erythema  ENT: external ear and ear canal normal bilaterally, nose without deformity, nasal mucosa and turbinates normal without polyps, oropharynx normal, dentition is normal for age, no lip or gum lesions noted  Neck: supple and non-tender without mass, no thyromegaly or thyroid nodules, no cervical lymphadenopathy  Pulmonary/Chest: clear but diminished to auscultation bilaterally- no wheezes, rales or rhonchi, normal air movement, no respiratory distress or retractions, requires chronic use of supplemental oxygen  Cardiovascular: normal rate, regular rhythm, normal S1 and S2, no murmurs, rubs, clicks, or gallops, distal pulses intact.   Right chest dialysis catheter is in place. Abdomen: obese abdomen is soft, non-tender, non-distended, bowel sounds physiologic,  no rebound or guarding, no masses or hernias noted. Liver and spleen without enlargement. Genitourinary:  Makes little or no urine. Extremities: no cyanosis, clubbing. Chronic, generalized edema to BLE is resolved. Musculoskeletal: No joint swelling or gross deformity. He prefers to be in bed most of the time but has been getting up to his motorized wheelchair occasionally. Neuro:  Alert and oriented, 4/5 strength to BUE. BLE with 0/5 strength, normal speech, no focal findings or movement disorder noted  Psych:  Normal affect without evidence of depression or anxiety, insight and judgement are intact, memory appears intact. He is routinely pleasant and communicates well with staff. Staff take good care of him. Skin: warm and dry, no rash or erythema;    ASSESSMENT & PLAN:    1. Acute kidney failure  Continue hemodialysis M-W-F. Current with Dr. Rupinder Polanco. He acknowledges that Dialysis is going \"better than he thought it would\". He is compliant with Dialysis. Staff tell me that his mother, who lives in her home, also receives hemodialysis at the same time that Stefany Morris does and that he appreciates being able to see his mother. He tool Hydroxyzine 25 mg po x 1 dose prior to hemodialysis and stated it worked well for his anxiety with dialysis. He agrees to stop Ativan and initiate Hydroxyzine 25 mg po x 1 dose prior to hemodialysis on dialysis days. I've instructed him to update Nursing staff if anxiety with hemodialysis persists. He agrees to this plan. Appreciate staff encouragement and support. 2.  Chronic pain  Pt reports chronic pain when sitting up to a chair and when in dialysis for prolong periods of time. Continue Oxycodone 5-10 mg po every 6 hours prn. OK for patient to have an additional dose of Oxycodone 5 mg po prior to getting up to a chair.   OK for patient to have an additional dose of Oxycodone 5 mg po prior to dialysis. Continue to monitor pain. Patient's chronic pain is from the chronic wound to his coccyx. He updates me that Dr. Edith Alvarado saw him last week or so and reports that he believe's Brooks's wound is healing. 3. Other specified diabetes mellitus with other specified complication, with long-term current use of insulin (HCC)  Metformin and Januvia were stopped due to poor kidney function. Continue sliding scale Humalog as ordered. I increased Levemir 12 units SQ daily due to elevated blood glucose levels routinely > 200. Today, I increased Levemir to 15 units sq once daily; blood glucose levels remain high. Staff tell me that his appetite is increased and he is less compliant with diabetic diet. A1C per routine. Continue to monitor. Disposition:  Assessment and plan as stated above. Follow-up per routine and as warranted.     Plan of care reviewed by Dr. Marcell Condon MD.  Electronically signed by GAYATHRI Chang NP on 8/9/2021 at 7:05 PM

## 2021-08-31 ENCOUNTER — OUTSIDE SERVICES (OUTPATIENT)
Dept: FAMILY MEDICINE CLINIC | Age: 41
End: 2021-08-31
Payer: MEDICARE

## 2021-08-31 DIAGNOSIS — F33.1 MAJOR DEPRESSIVE DISORDER, RECURRENT EPISODE, MODERATE (HCC): ICD-10-CM

## 2021-08-31 DIAGNOSIS — L89.154 STAGE IV PRESSURE ULCER OF SACRAL REGION (HCC): Chronic | ICD-10-CM

## 2021-08-31 DIAGNOSIS — E11.22 TYPE 2 DIABETES MELLITUS WITH CHRONIC KIDNEY DISEASE ON CHRONIC DIALYSIS, WITH LONG-TERM CURRENT USE OF INSULIN (HCC): Chronic | ICD-10-CM

## 2021-08-31 DIAGNOSIS — Z79.4 TYPE 2 DIABETES MELLITUS WITH CHRONIC KIDNEY DISEASE ON CHRONIC DIALYSIS, WITH LONG-TERM CURRENT USE OF INSULIN (HCC): Chronic | ICD-10-CM

## 2021-08-31 DIAGNOSIS — E66.01 MORBID OBESITY (HCC): ICD-10-CM

## 2021-08-31 DIAGNOSIS — F51.01 PRIMARY INSOMNIA: Chronic | ICD-10-CM

## 2021-08-31 DIAGNOSIS — M62.81 GENERALIZED MUSCLE WEAKNESS: ICD-10-CM

## 2021-08-31 DIAGNOSIS — I10 ESSENTIAL HYPERTENSION: Chronic | ICD-10-CM

## 2021-08-31 DIAGNOSIS — Z93.3 COLOSTOMY STATUS (HCC): ICD-10-CM

## 2021-08-31 DIAGNOSIS — Z99.2 ESRD (END STAGE RENAL DISEASE) ON DIALYSIS (HCC): Primary | ICD-10-CM

## 2021-08-31 DIAGNOSIS — N18.6 ESRD (END STAGE RENAL DISEASE) ON DIALYSIS (HCC): Primary | ICD-10-CM

## 2021-08-31 DIAGNOSIS — G89.29 OTHER CHRONIC PAIN: ICD-10-CM

## 2021-08-31 DIAGNOSIS — N18.6 TYPE 2 DIABETES MELLITUS WITH CHRONIC KIDNEY DISEASE ON CHRONIC DIALYSIS, WITH LONG-TERM CURRENT USE OF INSULIN (HCC): Chronic | ICD-10-CM

## 2021-08-31 DIAGNOSIS — Z99.2 TYPE 2 DIABETES MELLITUS WITH CHRONIC KIDNEY DISEASE ON CHRONIC DIALYSIS, WITH LONG-TERM CURRENT USE OF INSULIN (HCC): Chronic | ICD-10-CM

## 2021-08-31 PROCEDURE — 99310 SBSQ NF CARE HIGH MDM 45: CPT | Performed by: FAMILY MEDICINE

## 2021-08-31 NOTE — PROGRESS NOTES
[address] Progress Note    NAME: Jevon Arteaga  : 1980  REASON FOR VISIT:  Chief Complaint   Patient presents with    1 Month Follow-Up         CODE STATUS: Full Code    History obtained from chart review, the patient and staff. SUBJECTIVE:  HPI: Jevon Arteaga is a 39 y.o. male. Patient has a PMH significant for:  Past Medical History:   Diagnosis Date    Diabetes ([address]     HTN (hypertension)     Insomnia     Morbid [facility]     Pressure ulcer        Pt seen and examined at bedside today for monthly follow-up. He continues with dialysis, has chronic pain related to coccyx ulcer and elevated sugars with diabetes. He developed large amount of leakage around catheter yesterday. Nursing attempted to replace soto unsuccesfully and then patient stated he didn't want further attempts. Patient initially requested send to ER but then later refused and wanted to wait. He leaks frequently and has difficulty using urinal, but no retention. Today, he states gatito the wants to go early prior to dialysis tomorrow to ER for urology to change cather. Has been set up to f/u with urology as outpatient for routine f/u visits for catheter, but patient cancels the appts per staff, noting he doesn't want to f/u with urology on outpatient basis. I spoke [facility] nursing and they are setting pt up to go to ER after dialysis tomorrow due to transportation scheduling    States that Dr. Debora Dunham saw pt in facility last week for sacral wound and that was healing somewhat. Pt notes continued pain, but states is improving somewhat. DMII- sugars elevated 160-280. Hgb a1c on 8/3 7.2%      I have reviewed patients past medical, surgical, social, and family history and have made updates where appropriate. Allergies and Medications were reviewed through the [facility] EMR.     Patient Active Problem List    Diagnosis Date Noted    COVID-19 2020    Dialysis patient [facility]) 10/28/2020    ESRD (end stage renal disease) on dialysis [facility]) 09/15/2020    Desir catheter in place 09/15/2020    Encounter for dialysis catheter care [facility]) 09/15/2020    Personal history of noncompliance with medical treatment, presenting hazards to health 01/14/2020    Scrotal edema 11/12/2019    Bilateral leg edema 10/09/2019    Major depressive disorder, recurrent episode, moderate ([address] 08/14/2019    Colostomy status ([address] 07/25/2019    Generalized muscle weakness 07/25/2019    Other chronic pain 05/29/2019    Morbid obesity ([address]     Diabetes ([address]     Essential hypertension     Primary insomnia     Nursing home resident 12/29/2018    Morbid obesity due to excess calories ([address] 11/08/2018    Stage IV pressure ulcer of sacral region ([address] 11/08/2018     REVIEW OF SYSTEMS  Positive responses are highlighted in bold  Constitutional:  Fever, Chills, Night Sweats, Fatigue, Unexpected changes in weight, Generalized Pain to coccyx wound; anxiety with dialysis  HENT:  Ear pain, Tinnitus, Nosebleeds, Trouble swallowing, Hearing loss, Sore throat  Cardiovascular:  Chest Pain, Palpitations, Orthopnea, Paroxysmal Nocturnal Dyspnea  Respiratory:  Cough, Wheezing, Shortness of breath, Chest tightness, Apnea  Gastrointestinal:  Nausea, Vomiting, Diarrhea, Constipation, Heartburn, Blood in stool  Genitourinary:  Difficulty or painful urination, Flank pain, Change in frequency, Urgency  Skin:  Color change, Rash, Itching, Wound  Musculoskeletal:  Joint pain, Back pain, Gait problems, Joint swelling, Myalgias  Neurological:  Dizziness, Headaches, Presyncope, Numbness, Seizures, Tremors  Endocrine:  Heat Intolerance, Cold Intolerance, Polydipsia, Polyphagia, Polyuria    OBJECTIVE:  PHYSICAL EXAM:  Weight in pounds:  334.1 (was 338.4, 347.3, 429.9, 460.9, 473.0)  BP: 125/71 mmHg  Temp:98.2 °F  Pulse: 76 bpm  Weight: 334.1 Lbs  Resp: 18 Breaths/min  BS: 268 mg/dL  O2: 93 %      VS reviewed. General Appearance: morbidly obese, debilitated.   Head: normocephalic and atraumatic  Eyes: conjunctivae and eye lids without erythema  ENT: external ear and ear canal normal bilaterally, nose without deformity, nasal mucosa and turbinates normal without polyps, oropharynx normal, dentition is normal for age, no lip or gum lesions noted  Neck: supple and non-tender without mass, no thyromegaly or thyroid nodules, no cervical lymphadenopathy  Pulmonary/Chest: clear but diminished to auscultation bilaterally- no wheezes, rales or rhonchi, normal air movement, no respiratory distress or retractions, requires chronic use of supplemental oxygen  Cardiovascular: normal rate, regular rhythm, normal S1 and S2, no murmurs, rubs, clicks, or gallops, distal pulses intact. Abdomen: obese abdomen is soft, non-tender, non-distended, bowel sounds physiologic,  no rebound or guarding, no masses or hernias noted. Liver and spleen without enlargement. Genitourinary:  Makes little or no urine. Extremities: no cyanosis, clubbing. Tr  edema b/l LE  Musculoskeletal: No joint swelling or gross deformity. He prefers to be in bed most of the time but has been getting up to his motorized wheelchair occasionally. Neuro:  Alert and oriented, normal speech, no focal findings or movement disorder noted  Psych:  Normal affect without evidence of depression or anxiety, insight and judgement are intact, memory appears intact. Skin: warm and dry, no rash or erythema;    ASSESSMENT & PLAN:    1. ESRD on dialysis  Continue hemodialysis M-W-F. Current with Dr. Rayo Lopez. Staff tell me that his mother, who lives in her home, also receives hemodialysis at the same time that Somerdaledilip Michelle does and that he appreciates being able to see his mother. He has taken Hydroxyzine 25 mg po x 1 dose prior to hemodialysis and stated it worked well for his anxiety with dialysis. Ativan stopped previously.     2.  Chronic pain  Pt reports chronic pain when sitting up to a chair and when in dialysis for prolong periods of time.  Continue Oxycodone 5-10 mg po every 6 hours prn. OK for patient to have an additional dose of Oxycodone 5 mg po prior to getting up to a chair. OK for patient to have an additional dose of Oxycodone 5 mg po prior to dialysis. Continue to monitor pain. Patient's chronic pain is from the chronic wound to his coccyx. He updates me that Dr. Geno Aguirre saw him last week or so and reports that he believe's Brooks's wound is healing. 3. Other specified diabetes mellitus with other specified complication, with long-term current use of insulin (HCC)  Continue sliding scale Humalog as ordered. Sugars still elevated   Increase levemir from 15 to 17 units and [facility]  Staff tell me that his appetite is increased and he is less compliant with diabetic diet. 8/2021 Hgb a1c at 7.2%  Check Hgb a1c q 3 mos  Continue to monitor. 2.  Urinary incontinence  OK to maintain indwelling soto catheter for urinary retention due to edema. See HPI  Has refused to see urology as outpatient previously     3. Decubitus ulcer of sacral region, stage 4 ([facility])  Wound is chronic and non-healing. Continue wound care per Dr. Geno Aguirre. In-house Wound Nurse is on board  He reports feeling well and denies fevers and chills. Continue MVI and Zinc supplements.     4. COPD  Stable at time of assessment. Continue prn DuoNebs and supplemental oxygen as needed. Consider outpt PFTs if pt agreeable     5. Essential hypertension  Controlled  Will decrease dosage of Metoprolol 50 mg bid. Lisinopril was stopped for high potassium levels - has done well off Lisinopril. He denies chest pain, pressure, heaviness. No SOB. Monitor closely.     6. Primary insomnia  Continue home dose of Ambien. Notes some trouble falling asleep at times.     7.  Bilateral lower leg edema  Bilateral Edema is resolved. Routine weights with Dialysis are improving.     8.  Major depressive disorder, recurrent. No SI/HI.   He has declined consultation to Psych and Counseling services several times in the past.  Continue current dosage of Zoloft 100 mg po once daily. Antipsychotic/Antianxiety/Hypnotic/Psychotropic/Sedation/Antidepressant medications are continued at this time because discontinuation may result in adverse effects of return or concerning behaviors/symptoms.     9. Hyperlipidemia  ASA, statin. Routine lab monitoring.     10. Hypocalcemia  Continue supplemental calcium and vitamin D. Routine lab monitoring.     11. Attention to Colostomy  Diverting colostomy continues to drain formed stool. Will increase dosage of colace to 200 mg daily. Staff report no concerns.     12. Morbid obesity due to excess calories (HCC)  Appetite good. Noncompliant with diabetic diet. Encourage patient to be oob more and increase physical activity as much as possible, make healthy dietary choices. 13. B/l lower extremity weakness- chronic-   from chronic neuropathy, arthritis, decreased mobility per pt  Recommended PT/OT trial again-- pt agreeable to try if insurance covers      Disposition:  Assessment and plan as stated above. Follow-up per routine and as warranted. Total time spent on date of service was 40 minutes. Time spent includes some or all of the following, both face-to-face time and non face-to-face, but is not limited to: reviewing records or discussing history or plan with colleagues; obtaining and/or reviewing the history; performing a medically appropriate examination/evaluation; counseling patient and/or caregiver; documentation, placing orders/referrals, and coordinating care.

## 2021-09-04 PROBLEM — L03.115 CELLULITIS AND ABSCESS OF RIGHT LEG: Status: RESOLVED | Noted: 2020-06-29 | Resolved: 2021-09-04

## 2021-09-04 PROBLEM — E11.9 DIABETES MELLITUS (HCC): Status: RESOLVED | Noted: 2020-01-13 | Resolved: 2021-09-04

## 2021-09-04 PROBLEM — L02.415 CELLULITIS AND ABSCESS OF RIGHT LEG: Status: RESOLVED | Noted: 2020-06-29 | Resolved: 2021-09-04

## 2021-09-04 PROBLEM — R60.1 ANASARCA: Status: RESOLVED | Noted: 2020-01-14 | Resolved: 2021-09-04

## 2021-09-22 ENCOUNTER — OUTSIDE SERVICES (OUTPATIENT)
Dept: FAMILY MEDICINE CLINIC | Age: 41
End: 2021-09-22
Payer: MEDICARE

## 2021-09-22 DIAGNOSIS — E66.01 MORBID OBESITY DUE TO EXCESS CALORIES (HCC): Chronic | ICD-10-CM

## 2021-09-22 DIAGNOSIS — Z99.2 ESRD (END STAGE RENAL DISEASE) ON DIALYSIS (HCC): ICD-10-CM

## 2021-09-22 DIAGNOSIS — Z99.2 DIALYSIS PATIENT (HCC): Chronic | ICD-10-CM

## 2021-09-22 DIAGNOSIS — N18.6 TYPE 2 DIABETES MELLITUS WITH CHRONIC KIDNEY DISEASE ON CHRONIC DIALYSIS, WITH LONG-TERM CURRENT USE OF INSULIN (HCC): Primary | Chronic | ICD-10-CM

## 2021-09-22 DIAGNOSIS — Z93.3 COLOSTOMY STATUS (HCC): ICD-10-CM

## 2021-09-22 DIAGNOSIS — Z99.2 TYPE 2 DIABETES MELLITUS WITH CHRONIC KIDNEY DISEASE ON CHRONIC DIALYSIS, WITH LONG-TERM CURRENT USE OF INSULIN (HCC): Primary | Chronic | ICD-10-CM

## 2021-09-22 DIAGNOSIS — M62.81 GENERALIZED MUSCLE WEAKNESS: ICD-10-CM

## 2021-09-22 DIAGNOSIS — R60.0 BILATERAL LEG EDEMA: ICD-10-CM

## 2021-09-22 DIAGNOSIS — Z91.199 PERSONAL HISTORY OF NONCOMPLIANCE WITH MEDICAL TREATMENT, PRESENTING HAZARDS TO HEALTH: ICD-10-CM

## 2021-09-22 DIAGNOSIS — E11.22 TYPE 2 DIABETES MELLITUS WITH CHRONIC KIDNEY DISEASE ON CHRONIC DIALYSIS, WITH LONG-TERM CURRENT USE OF INSULIN (HCC): Primary | Chronic | ICD-10-CM

## 2021-09-22 DIAGNOSIS — G89.29 OTHER CHRONIC PAIN: ICD-10-CM

## 2021-09-22 DIAGNOSIS — Z97.8 FOLEY CATHETER IN PLACE: ICD-10-CM

## 2021-09-22 DIAGNOSIS — I10 ESSENTIAL HYPERTENSION: Chronic | ICD-10-CM

## 2021-09-22 DIAGNOSIS — N18.6 ESRD (END STAGE RENAL DISEASE) ON DIALYSIS (HCC): ICD-10-CM

## 2021-09-22 DIAGNOSIS — F51.01 PRIMARY INSOMNIA: Chronic | ICD-10-CM

## 2021-09-22 DIAGNOSIS — L89.154 STAGE IV PRESSURE ULCER OF SACRAL REGION (HCC): Chronic | ICD-10-CM

## 2021-09-22 DIAGNOSIS — Z79.4 TYPE 2 DIABETES MELLITUS WITH CHRONIC KIDNEY DISEASE ON CHRONIC DIALYSIS, WITH LONG-TERM CURRENT USE OF INSULIN (HCC): Primary | Chronic | ICD-10-CM

## 2021-09-22 DIAGNOSIS — F33.1 MAJOR DEPRESSIVE DISORDER, RECURRENT EPISODE, MODERATE (HCC): ICD-10-CM

## 2021-09-22 PROCEDURE — 99309 SBSQ NF CARE MODERATE MDM 30: CPT | Performed by: NURSE PRACTITIONER

## 2021-09-22 NOTE — PROGRESS NOTES
bold  Constitutional:  Fever, Chills, Night Sweats, Fatigue, Unexpected changes in weight, Generalized Pain to coccyx wound   HENT:  Ear pain, Tinnitus, Nosebleeds, Trouble swallowing, Hearing loss, Sore throat  Cardiovascular:  Chest Pain, Palpitations, Orthopnea, Paroxysmal Nocturnal Dyspnea  Respiratory:  Cough, Wheezing, Shortness of breath, Chest tightness, Apnea  Gastrointestinal:  Nausea, Vomiting, Diarrhea, Constipation, Heartburn, Blood in stool  Genitourinary:  Difficulty or painful urination, Flank pain, Change in frequency, Urgency  Skin:  Color change, Rash, Itching, Wound  Musculoskeletal:  Joint pain, Back pain, Gait problems, Joint swelling, Myalgias  Neurological:  Dizziness, Headaches, Presyncope, Numbness, Seizures, Tremors  Endocrine:  Heat Intolerance, Cold Intolerance, Polydipsia, Polyphagia, Polyuria    OBJECTIVE:  PHYSICAL EXAM:  VS:  112/64, 97.6, 72, 18, 227 blood glucose, 95% on supplemental oxygen. Weight in pounds:  334.4 (was 338.4, 347.3, 429.9, 460.9, 473.0)  Pain: Generalized pain is controlled with current pain medication. VS reviewed. General Appearance: morbidly obese, debilitated. Head: normocephalic and atraumatic  Eyes: conjunctivae and eye lids without erythema  ENT: external ear and ear canal normal bilaterally, nose without deformity, nasal mucosa and turbinates normal without polyps, oropharynx normal, dentition is normal for age, no lip or gum lesions noted  Neck: supple and non-tender without mass, no thyromegaly or thyroid nodules, no cervical lymphadenopathy  Pulmonary/Chest: congestion clears with cough. Diminished to auscultation bilaterally- no wheezes, rales or rhonchi, normal air movement, no respiratory distress or retractions, requires chronic use of supplemental oxygen  Cardiovascular: normal rate, regular rhythm, normal S1 and S2, no murmurs, rubs, clicks, or gallops, distal pulses intact. Right chest dialysis catheter is in place.   Abdomen: obese supplemental oxygen as needed. 5. Essential hypertension  SBP is routinely less than 120 and HR is routinely less than 80. Will decrease dosage of Metoprolol from 50 mg q8h to 50 mg bid. Lisinopril was stopped for high potassium levels - has done well off Lisinopril. He denies chest pain, pressure, heaviness. No SOB. Monitor closely. 6.  Chronic pain  Pt reports chronic pain when sitting up to a chair and when in dialysis for prolong periods of time. Continue Oxycodone 5-10 mg po every 6 hours prn. OK for patient to have an additional dose of Oxycodone 5 mg po prior to getting up to a chair. OK for patient to have an additional dose of Oxycodone 5 mg po prior to dialysis. Continue to monitor pain. Patient's chronic pain is from the chronic wound to his coccyx. He updates me that Dr. Geno Aguirre saw him last week or so and reports that he believe's Brooks's wound is healing. 7. Other specified diabetes mellitus with other specified complication, with long-term current use of insulin (HCC)  Metformin and Januvia were stopped due to poor kidney function. Continue sliding scale Humalog, Levemir as ordered. Staff tell me that his appetite is increased and he is less compliant with diabetic diet. A1C per routine. Continue to monitor. 8. Primary insomnia  Continue home dose of Ambien. Denies problems sleeping at night. 9.  Bilateral lower leg edema  Bilateral Edema is resolved. Routine weights with Dialysis are improving. 10.  Major depressive disorder, recurrent. No SI/HI. He smiles and makes eye contact and engages in conversation. He is appreciative and reports dialysis is going well. He has declined consultation to Psych and Counseling services several times in the past.  He is encouraged to be oob in his motorized wheelchair as tolerates. Continue current dosage of Zoloft 100 mg po once daily.   Antipsychotic/Antianxiety/Hypnotic/Psychotropic/Sedation/Antidepressant medications are continued at this time because discontinuation may result in adverse effects of return or concerning behaviors/symptoms. 11.  Hyperlipidemia  ASA, statin. Routine lab monitoring. 12.  Hypocalcemia  Continue supplemental calcium and vitamin D. Routine lab monitoring. 13.  Attention to Colostomy  Diverting colostomy continues to drain formed stool. Will increase dosage of colace to 200 mg daily. Staff report no concerns. 14. Morbid obesity due to excess calories (HCC)  Appetite good. Noncompliant with diabetic diet. Encourage patient to be oob in wheelchair on a daily basis. Disposition:  Assessment and plan as stated above. Follow-up per routine and as warranted.     Plan of care reviewed by Dr. Leila Flores MD.  Electronically signed by GAYATHRI Espitia NP on 9/22/2021 at 5:13 PM

## 2021-10-21 ENCOUNTER — OUTSIDE SERVICES (OUTPATIENT)
Dept: FAMILY MEDICINE CLINIC | Age: 41
End: 2021-10-21
Payer: MEDICARE

## 2021-10-21 DIAGNOSIS — S81.801S OPEN WOUND OF RIGHT LOWER LEG, SEQUELA: ICD-10-CM

## 2021-10-21 DIAGNOSIS — G89.29 OTHER CHRONIC PAIN: ICD-10-CM

## 2021-10-21 DIAGNOSIS — N18.6 TYPE 2 DIABETES MELLITUS WITH CHRONIC KIDNEY DISEASE ON CHRONIC DIALYSIS, WITH LONG-TERM CURRENT USE OF INSULIN (HCC): Chronic | ICD-10-CM

## 2021-10-21 DIAGNOSIS — L89.154 STAGE IV PRESSURE ULCER OF SACRAL REGION (HCC): Chronic | ICD-10-CM

## 2021-10-21 DIAGNOSIS — F33.1 MAJOR DEPRESSIVE DISORDER, RECURRENT EPISODE, MODERATE (HCC): ICD-10-CM

## 2021-10-21 DIAGNOSIS — Z79.4 TYPE 2 DIABETES MELLITUS WITH CHRONIC KIDNEY DISEASE ON CHRONIC DIALYSIS, WITH LONG-TERM CURRENT USE OF INSULIN (HCC): Chronic | ICD-10-CM

## 2021-10-21 DIAGNOSIS — Z93.3 COLOSTOMY STATUS (HCC): ICD-10-CM

## 2021-10-21 DIAGNOSIS — E66.01 MORBID OBESITY DUE TO EXCESS CALORIES (HCC): Chronic | ICD-10-CM

## 2021-10-21 DIAGNOSIS — Z99.2 TYPE 2 DIABETES MELLITUS WITH CHRONIC KIDNEY DISEASE ON CHRONIC DIALYSIS, WITH LONG-TERM CURRENT USE OF INSULIN (HCC): Chronic | ICD-10-CM

## 2021-10-21 DIAGNOSIS — E11.22 TYPE 2 DIABETES MELLITUS WITH CHRONIC KIDNEY DISEASE ON CHRONIC DIALYSIS, WITH LONG-TERM CURRENT USE OF INSULIN (HCC): Chronic | ICD-10-CM

## 2021-10-21 DIAGNOSIS — I10 ESSENTIAL HYPERTENSION: Primary | Chronic | ICD-10-CM

## 2021-10-21 DIAGNOSIS — Z99.2 ESRD (END STAGE RENAL DISEASE) ON DIALYSIS (HCC): ICD-10-CM

## 2021-10-21 DIAGNOSIS — N18.6 ESRD (END STAGE RENAL DISEASE) ON DIALYSIS (HCC): ICD-10-CM

## 2021-10-21 DIAGNOSIS — R60.0 BILATERAL LEG EDEMA: ICD-10-CM

## 2021-10-21 PROBLEM — S81.801A OPEN WOUND OF RIGHT LOWER LEG: Status: ACTIVE | Noted: 2021-10-21

## 2021-10-21 PROCEDURE — 99310 SBSQ NF CARE HIGH MDM 45: CPT | Performed by: NURSE PRACTITIONER

## 2021-10-21 NOTE — PROGRESS NOTES
[facility] [facility] Progress Note    NAME: Efren Landry  DATE: 10/21/21  ROOM #: [id number] [id number]  : 1980  REASON FOR VISIT: Other (routine visit)    CODE STATUS: Full Code    History obtained from chart review, the patient and staff. SUBJECTIVE:  HPI: Terrie Guevara is a 39 y.o. male. Patient has a PMH significant for:  Past Medical History:   Diagnosis Date    Diabetes ([address]     HTN (hypertension)     Insomnia     Morbid obesity ([address]     Pressure ulcer        Pt seen and examined at bedside today for routine visit and is noted to be in no acute distress. He reports feeling well and has no questions or concerns. Staff provide appropriate updates; appreciate staff input. I have reviewed patients past medical, surgical, social, and family history and have made updates where appropriate. Allergies and Medications were reviewed through the [facility] EMR.     Patient Active Problem List    Diagnosis Date Noted    Open wound of right lower leg 10/21/2021    COVID-19 2020    Dialysis patient ([address] 10/28/2020    ESRD (end stage renal disease) on dialysis ([address] 09/15/2020    Desir catheter in place 09/15/2020    Encounter for dialysis catheter [facility]) 09/15/2020    Personal history of noncompliance with medical treatment, presenting hazards to health 2020    Scrotal edema 2019    Bilateral leg edema 10/09/2019    Major depressive disorder, recurrent episode, moderate ([address] 2019    Colostomy status ([address] 2019    Generalized muscle weakness 2019    Other chronic pain 2019    Morbid obesity ([address]     Diabetes ([address]     Essential hypertension     Primary insomnia     Nursing home resident 2018    Morbid obesity due to excess calories ([address] 2018    Stage IV pressure ulcer of sacral region ([address] 2018     REVIEW OF SYSTEMS  Positive responses are highlighted in bold  Constitutional:  Fever, Chills, Night Sweats, Fatigue, Unexpected changes in weight, Generalized Pain to coccyx wound   HENT:  Ear pain, Tinnitus, Nosebleeds, Trouble swallowing, Hearing loss, Sore throat  Cardiovascular:  Chest Pain, Palpitations, Orthopnea, Paroxysmal Nocturnal Dyspnea  Respiratory:  Cough, Wheezing, Shortness of breath, Chest tightness, Apnea  Gastrointestinal:  Nausea, Vomiting, Diarrhea, Constipation, Heartburn, Blood in stool  Genitourinary:  Difficulty or painful urination, Flank pain, Change in frequency, Urgency  Skin:  Color change, Rash, Itching, Wound  Musculoskeletal:  Joint pain, Back pain, Gait problems, Joint swelling, Myalgias  Neurological:  Dizziness, Headaches, Presyncope, Numbness, Seizures, Tremors  Endocrine:  Heat Intolerance, Cold Intolerance, Polydipsia, Polyphagia, Polyuria    OBJECTIVE:  PHYSICAL EXAM:  VS:  111/64, 97.5, 89, 18, 221 blood glucose, 96% on 2-3 liters oxygen via nasal cannula  Weight in pounds:  334.4 (was 338.4, 347.3, 429.9, 460.9, 473.0)  Pain: Generalized pain is controlled with current pain medication. VS reviewed. General Appearance: morbidly obese, debilitated. Head: normocephalic and atraumatic  Eyes: conjunctivae and eye lids without erythema  ENT: external ear and ear canal normal bilaterally, nose without deformity, nasal mucosa and turbinates normal without polyps, oropharynx normal, dentition is normal for age, no lip or gum lesions noted  Neck: supple and non-tender without mass, no thyromegaly or thyroid nodules, no cervical lymphadenopathy  Pulmonary/Chest: Lungs clear but diminished to auscultation bilaterally- no wheezes, rales or rhonchi, normal air movement, no respiratory distress or retractions, requires chronic use of supplemental oxygen  Cardiovascular: normal rate, regular rhythm, normal S1 and S2, no murmurs, rubs, clicks, or gallops, distal pulses intact. Right chest dialysis catheter is in place.   Abdomen: obese abdomen is soft, non-tender, non-distended, bowel sounds physiologic,  no rebound or guarding, no masses or hernias noted. Liver and spleen without enlargement. Colostomy in LUQ drains formed stool. Genitourinary:  Makes little or no urine. Extremities: no cyanosis, clubbing. Chronic, generalized edema to BLE is resolved. Musculoskeletal: No joint swelling or gross deformity. He prefers to be in bed most of the time but has been getting up to his motorized wheelchair occasionally. Neuro:  Alert and oriented, 4/5 strength to BUE. BLE with 0/5 strength, normal speech, no focal findings or movement disorder noted  Psych:  Normal affect without evidence of depression or anxiety, insight and judgement are intact, memory appears intact. He is routinely pleasant and communicates well with staff. Skin: warm and dry, no rash or erythema;    LABS  8/3/21:  A1C 7.2/160.  6/1/21:  Wound positive for A. BAUMANNII, E. COLI, S. AGALACT-GP BP  6/1/21:  WBC 9.1, Hgb 9.3, platelets 403.  5/7/04:  Na 135, K 5.1, chl 97, CO2 31, BUN 28, Creat 2.2, GFR 33, calcium 9.2.  3/1/21:  Cholesterol 96, triglyc 160, HDL 25, LDL 39,   3/1/21:  Vitamin D 34. ASSESSMENT & PLAN:    1. Acute kidney failure  Continue hemodialysis M-W-F. Current with Dr. Ty Arce. Ativan was stopped and hydroxyzine 25 mg was initiated prior to hemodialysis. He states hydralazine is effective for anxiety. He acknowledges that Dialysis is going \"better than he thought it would\". Appreciate staff encouragement and support. 2.  Urinary retention  OK to maintain indwelling soto catheter for urinary retention due to edema. 3. Decubitus ulcer of sacral region, stage 4 (HCC)  Chronic. Continue wound care per Dr. Nettie Deleon. Clayton Williamson updates that Dr. James Moreno anticipates being able to close his coccyx wound in the future. In-house Wound Nurse is on board and reports his wound is stable. He reports feeling well and denies fevers and chills. Continue MVI and Zinc supplements. 4.  COPD  Stable at time of assessment.   Continue prn DuoNebs and supplemental oxygen as needed. 5. Essential hypertension  SBP is routinely less than 120 and HR is routinely less than 80. Has done well with decreased dosage of Metoprolol 50 mg PO bid. Lisinopril was stopped for high potassium levels - has done well off Lisinopril. He denies chest pain, pressure, heaviness. No SOB. Monitor closely. 6.  Chronic pain  Pt reports chronic pain when sitting up to a chair and when in dialysis for prolong periods of time. Continue Oxycodone 5-10 mg po every 6 hours prn. OK for patient to have an additional dose of Oxycodone 5 mg po prior to getting up to a chair. OK for patient to have an additional dose of Oxycodone 5 mg po prior to dialysis. Continue to monitor pain. Patient's chronic pain is from the chronic wound to his coccyx. He updates me that Dr. Daryl Resendiz saw him last week or so and reports that he believe's Brooks's wound is healing. 7. Other specified diabetes mellitus with other specified complication, with long-term current use of insulin (HCC)  Metformin and Januvia were stopped due to poor kidney function. Continue sliding scale Humalog, Levemir as ordered. Staff tell me that his appetite is increased and he is less compliant with diabetic diet. A1C per routine. No episodes of hypoglycemia. Continue to monitor. 8. Primary insomnia  Continue home dose of Ambien. He has failed Trazodone in the past.  Denies problems sleeping at night. 9.  Bilateral lower leg edema  Bilateral Edema is resolved. Routine weights with Dialysis are improving. 10.  Major depressive disorder, recurrent. No SI/HI. He smiles and makes eye contact and engages in conversation. He is appreciative and reports dialysis is going well. He has declined consultation to Psych and Counseling services several times in the past.  He is encouraged to be oob in his motorized wheelchair as tolerates. Continue current dosage of Zoloft 100 mg po once daily.   He communicates well

## 2021-11-16 ENCOUNTER — OUTSIDE SERVICES (OUTPATIENT)
Dept: FAMILY MEDICINE CLINIC | Age: 41
End: 2021-11-16
Payer: MEDICARE

## 2021-11-16 DIAGNOSIS — E11.22 TYPE 2 DIABETES MELLITUS WITH CHRONIC KIDNEY DISEASE ON CHRONIC DIALYSIS, WITH LONG-TERM CURRENT USE OF INSULIN (HCC): ICD-10-CM

## 2021-11-16 DIAGNOSIS — Z99.2 TYPE 2 DIABETES MELLITUS WITH CHRONIC KIDNEY DISEASE ON CHRONIC DIALYSIS, WITH LONG-TERM CURRENT USE OF INSULIN (HCC): ICD-10-CM

## 2021-11-16 DIAGNOSIS — N50.89 SCROTAL EDEMA: ICD-10-CM

## 2021-11-16 DIAGNOSIS — F33.1 MAJOR DEPRESSIVE DISORDER, RECURRENT EPISODE, MODERATE (HCC): ICD-10-CM

## 2021-11-16 DIAGNOSIS — E66.01 MORBID OBESITY DUE TO EXCESS CALORIES (HCC): ICD-10-CM

## 2021-11-16 DIAGNOSIS — E11.69 TYPE 2 DIABETES MELLITUS WITH OTHER SPECIFIED COMPLICATION, WITHOUT LONG-TERM CURRENT USE OF INSULIN (HCC): Primary | ICD-10-CM

## 2021-11-16 DIAGNOSIS — Z99.2 DIALYSIS PATIENT (HCC): ICD-10-CM

## 2021-11-16 DIAGNOSIS — N18.6 ESRD (END STAGE RENAL DISEASE) ON DIALYSIS (HCC): ICD-10-CM

## 2021-11-16 DIAGNOSIS — N18.6 TYPE 2 DIABETES MELLITUS WITH CHRONIC KIDNEY DISEASE ON CHRONIC DIALYSIS, WITH LONG-TERM CURRENT USE OF INSULIN (HCC): ICD-10-CM

## 2021-11-16 DIAGNOSIS — Z99.2 ESRD (END STAGE RENAL DISEASE) ON DIALYSIS (HCC): ICD-10-CM

## 2021-11-16 DIAGNOSIS — Z79.4 TYPE 2 DIABETES MELLITUS WITH CHRONIC KIDNEY DISEASE ON CHRONIC DIALYSIS, WITH LONG-TERM CURRENT USE OF INSULIN (HCC): ICD-10-CM

## 2021-11-16 DIAGNOSIS — L89.154 STAGE IV PRESSURE ULCER OF SACRAL REGION (HCC): ICD-10-CM

## 2021-11-16 DIAGNOSIS — M62.81 GENERALIZED MUSCLE WEAKNESS: ICD-10-CM

## 2021-11-16 DIAGNOSIS — I10 ESSENTIAL HYPERTENSION: ICD-10-CM

## 2021-11-16 DIAGNOSIS — Z93.3 COLOSTOMY STATUS (HCC): ICD-10-CM

## 2021-11-16 DIAGNOSIS — R60.0 BILATERAL LEG EDEMA: ICD-10-CM

## 2021-11-16 PROCEDURE — 99310 SBSQ NF CARE HIGH MDM 45: CPT | Performed by: FAMILY MEDICINE

## 2021-11-16 NOTE — PROGRESS NOTES
[facility] [facility] 5&20 Progress Note    NAME: Karyn Gonzalez  : 1980  REASON FOR VISIT:  Chief Complaint   Patient presents with    1 Month Follow-Up       CODE STATUS: Full Code    History obtained from chart review, the patient and staff. SUBJECTIVE:  HPI: Karyn Gonzalez is a 39 y.o. male. Patient has a PMH significant for:  Past Medical History:   Diagnosis Date    Diabetes ([address]     HTN (hypertension)     Insomnia     Morbid obesity ([address]     Pressure ulcer        Pt seen and examined at bedside today for monthly follow-up. He continues with dialysis, has chronic pain related to coccyx ulcer and elevated sugars with diabetes. Patient denies any new concerns. He states wound on coccyx is slowly improving. He states otherwise he overall stable with no new concerns. Discussed importance of changing positions and offloading pressure frequently during day. DMII- sugars elevated 70s-low 200s. Hgb a1c improved on 21 at 7.0% which patient stated he was happy with. Discussed that this is good and continue goal is tighter sugar control, with a1c goal ideally close to 6.5% if we can get there without hypoglycemia. I have reviewed patients past medical, surgical, social, and family history and have made updates where appropriate. Allergies and Medications were reviewed through the [facility] EMR.     Patient Active Problem List    Diagnosis Date Noted    Open wound of right lower leg 10/21/2021    COVID-19 2020    Dialysis patient ([address] 10/28/2020    ESRD (end stage renal disease) on dialysis ([address] 09/15/2020    Desir catheter in place 09/15/2020    Encounter for dialysis catheter [facility]) 09/15/2020    Personal history of noncompliance with medical treatment, presenting hazards to health 2020    Scrotal edema 2019    Bilateral leg edema 10/09/2019    Major depressive disorder, recurrent episode, moderate ([address] 2019    Colostomy status ([address] 2019    Generalized muscle weakness 07/25/2019    Other chronic pain 05/29/2019    [facility]bid [facility]     Diabetes ([facility]     Essential hypertension     Primary insomnia     Nursing home resident 12/29/2018    Morbid obesity due to excess calories ([facility] 11/08/2018    Stage IV pressure ulcer of sacral region ([facility] 11/08/2018     REVIEW OF SYSTEMS  Positive responses are highlighted in bold  Constitutional:  Fever, Chills, Night Sweats, Fatigue, Unexpected changes in weight, Generalized Pain to coccyx wound; anxiety with dialysis  HENT:  Ear pain, Tinnitus, Nosebleeds, Trouble swallowing, Hearing loss, Sore throat  Cardiovascular:  Chest Pain, Palpitations, Orthopnea, Paroxysmal Nocturnal Dyspnea  Respiratory:  Cough, Wheezing, Shortness of breath, Chest tightness, Apnea  Gastrointestinal:  Nausea, Vomiting, Diarrhea, Constipation, Heartburn, Blood in stool  Genitourinary:  Difficulty or painful urination, Flank pain, Change in frequency, Urgency  Skin:  Color change, Rash, Itching, Wound  Musculoskeletal:  Joint pain, Back pain, Gait problems, Joint swelling, Myalgias  Neurological:  Dizziness, Headaches, Presyncope, Numbness, Seizures, Tremors  Endocrine:  Heat Intolerance, Cold Intolerance, Polydipsia, Polyphagia, Polyuria    OBJECTIVE:  PHYSICAL EXAM:    BP: 104/69 mmHg  11/15/2021 23:58  Temp:97.2 °F  11/16/2021 05:39  Pulse:100 bpm  11/15/2021 23:58  Weight:337.7 Lbs  11/16/2021 10:49  Resp:18 Breaths/min  11/15/2021 23:58  BS:164 mg/dL  11/16/2021 04:44  O2:94 %  11/16/2021 05:16    VS reviewed. General Appearance: morbidly obese, debilitated.   Head: normocephalic and atraumatic  Eyes: conjunctivae and eye lids without erythema  ENT: external ear and ear canal normal bilaterally, nose without deformity, nasal mucosa and turbinates normal without polyps, oropharynx normal, dentition is normal for age, no lip or gum lesions noted  Neck: supple and non-tender without mass, no thyromegaly or thyroid nodules, no cervical lymphadenopathy  Pulmonary/Chest: clear but diminished to auscultation bilaterally- no wheezes, rales or rhonchi, normal air movement, no respiratory distress or retractions, requires chronic use of supplemental oxygen  Cardiovascular: normal rate, regular rhythm, normal S1 and S2, no murmurs, rubs, clicks, or gallops, distal pulses intact. Abdomen: obese abdomen is soft, non-tender, non-distended, bowel sounds physiologic,  no rebound or guarding, no masses or hernias noted. Liver and spleen without enlargement. Genitourinary:  Makes little or no urine. Extremities: no cyanosis, clubbing. Tr  edema b/l LE  Musculoskeletal: No joint swelling or gross deformity. He prefers to be in bed most of the time but has been getting up to his motorized wheelchair occasionally. Neuro:  Alert and oriented, normal speech, no focal findings or movement disorder noted  Psych:  Normal affect without evidence of depression or anxiety, insight and judgement are intact, memory appears intact. Skin: warm and dry, no rash or erythema;    ASSESSMENT & PLAN:    1. ESRD on dialysis  Continue hemodialysis M-W-F. Current with Dr. Cliff Pitt. Staff tell me that his mother, who lives in her home, also receives hemodialysis at the same time that Efren Goldberg does and that he appreciates being able to see his mother. He has taken Hydroxyzine 25 mg po x 1 dose prior to hemodialysis and stated it worked well for his anxiety with dialysis. Ativan stopped previously. 2.  Chronic pain  Pt reports chronic pain when sitting up to a chair and when in dialysis for prolong periods of time. Continue Oxycodone 5 mg po every 6 hours prn. OK for patient to have an additional dose of Oxycodone 5 mg po prior to getting up to a chair. OK for patient to have an additional dose of Oxycodone 5 mg po prior to dialysis. Continue to monitor pain. Patient's chronic pain is from the chronic wound to his coccyx.   He updates me that Dr. Jordan Corona saw him last week or so and reports that he believe's Brooks's wound is healing. 3. Other specified diabetes mellitus with other specified complication, with long-term current use of insulin (HCC)  Sugars variable but improved  Cont evemir 17 units and montior  Continue sliding scale Humalog as ordered. Often not complaint with diet  11/2021 Hgb a1c at 7.0%  Check Hgb a1c q 3 mos  Continue to monitor. 2.  Urinary incontinence  OK to maintain indwelling soto catheter for urinary retention due to edema. See HPI  Has refused to see urology as outpatient previously     3. Decubitus ulcer of sacral region, stage 4 ([facility])  Wound is chronic and non-healing. Continue wound care per Dr. Perry Gaytan. In-house Wound Nurse is on board  He reports feeling well and denies fevers and chills. Continue MVI and Zinc supplements.     4. COPD  Stable at time of assessment. Continue prn DuoNebs and supplemental oxygen as needed. Consider outpt PFTs if pt agreeable     5. Essential hypertension  Controlled  Cont Metoprolol 50 mg bid. Lisinopril was stopped for high potassium levels - has done well off Lisinopril. He denies chest pain, pressure, heaviness. No SOB. Monitor closely.     6. Primary insomnia  Continue home dose of Ambien. Notes some trouble falling asleep at times.     7.  Bilateral lower leg edema  Bilateral Edema is resolved. Routine weights with Dialysis are improving.     8.  Major depressive disorder, recurrent. No SI/HI. He has declined consultation to Psych and Counseling services several times in the past.  Continue current dosage of Zoloft 100 mg po once daily. Antipsychotic/Antianxiety/Hypnotic/Psychotropic/Sedation/Antidepressant medications are continued at this time because discontinuation may result in adverse effects of return or concerning behaviors/symptoms.     9. Hyperlipidemia  ASA, statin. Routine lab monitoring. Reviewed recent Nov labs and were OK     10.   Hypocalcemia  Continue supplemental

## 2021-12-08 ENCOUNTER — OUTSIDE SERVICES (OUTPATIENT)
Dept: FAMILY MEDICINE CLINIC | Age: 41
End: 2021-12-08
Payer: MEDICARE

## 2021-12-08 DIAGNOSIS — Z99.2 TYPE 2 DIABETES MELLITUS WITH CHRONIC KIDNEY DISEASE ON CHRONIC DIALYSIS, WITH LONG-TERM CURRENT USE OF INSULIN (HCC): Chronic | ICD-10-CM

## 2021-12-08 DIAGNOSIS — F51.01 PRIMARY INSOMNIA: Chronic | ICD-10-CM

## 2021-12-08 DIAGNOSIS — E66.01 MORBID OBESITY DUE TO EXCESS CALORIES (HCC): Primary | Chronic | ICD-10-CM

## 2021-12-08 DIAGNOSIS — M62.81 GENERALIZED MUSCLE WEAKNESS: ICD-10-CM

## 2021-12-08 DIAGNOSIS — R60.0 BILATERAL LEG EDEMA: ICD-10-CM

## 2021-12-08 DIAGNOSIS — Z97.8 FOLEY CATHETER IN PLACE: ICD-10-CM

## 2021-12-08 DIAGNOSIS — Z99.2 ESRD (END STAGE RENAL DISEASE) ON DIALYSIS (HCC): ICD-10-CM

## 2021-12-08 DIAGNOSIS — Z49.01 ENCOUNTER FOR DIALYSIS CATHETER CARE (HCC): ICD-10-CM

## 2021-12-08 DIAGNOSIS — N18.6 TYPE 2 DIABETES MELLITUS WITH CHRONIC KIDNEY DISEASE ON CHRONIC DIALYSIS, WITH LONG-TERM CURRENT USE OF INSULIN (HCC): Chronic | ICD-10-CM

## 2021-12-08 DIAGNOSIS — F33.1 MAJOR DEPRESSIVE DISORDER, RECURRENT EPISODE, MODERATE (HCC): ICD-10-CM

## 2021-12-08 DIAGNOSIS — Z93.3 COLOSTOMY STATUS (HCC): ICD-10-CM

## 2021-12-08 DIAGNOSIS — Z79.4 TYPE 2 DIABETES MELLITUS WITH CHRONIC KIDNEY DISEASE ON CHRONIC DIALYSIS, WITH LONG-TERM CURRENT USE OF INSULIN (HCC): Chronic | ICD-10-CM

## 2021-12-08 DIAGNOSIS — N18.6 ESRD (END STAGE RENAL DISEASE) ON DIALYSIS (HCC): ICD-10-CM

## 2021-12-08 DIAGNOSIS — I10 ESSENTIAL HYPERTENSION: Chronic | ICD-10-CM

## 2021-12-08 DIAGNOSIS — L89.154 STAGE IV PRESSURE ULCER OF SACRAL REGION (HCC): Chronic | ICD-10-CM

## 2021-12-08 DIAGNOSIS — E11.22 TYPE 2 DIABETES MELLITUS WITH CHRONIC KIDNEY DISEASE ON CHRONIC DIALYSIS, WITH LONG-TERM CURRENT USE OF INSULIN (HCC): Chronic | ICD-10-CM

## 2021-12-08 PROCEDURE — 99310 SBSQ NF CARE HIGH MDM 45: CPT | Performed by: NURSE PRACTITIONER

## 2021-12-14 NOTE — PROGRESS NOTES
[address] [facility] 5&20 Progress Note    NAME: Aneudy Landry  DATE: 21  ROOM #: [id number] [id number]  : 1980  REASON FOR VISIT: Other (routine visit)    CODE STATUS: Full Code    History obtained from chart review, the patient and staff. SUBJECTIVE:  HPI: Tran Motta is a 39 y.o. male. Patient has a PMH significant for:  Past Medical History:   Diagnosis Date    Diabetes ([address]     HTN (hypertension)     Insomnia     Morbid obesity ([address]     Pressure ulcer        Pt seen and examined at bedside today for routine visit and is noted to be in no acute distress. He reports feeling well and has no questions or concerns. Staff provide appropriate updates; appreciate staff input. He updates that his mother  recently and that he is \"ok with it because she was on Hospice for awhile so he was prepared\". He denies depression and is encouraged to update Nursing staff if he has any needs. I have reviewed patients past medical, surgical, social, and family history and have made updates where appropriate. Allergies and Medications were reviewed through the [facility] EMR.     Patient Active Problem List    Diagnosis Date Noted    Open wound of right lower leg 10/21/2021    COVID-19 2020    Dialysis patient ([address] 10/28/2020    ESRD (end stage renal disease) on dialysis ([address] 09/15/2020    Desir catheter in place 09/15/2020    Encounter for dialysis catheter care [facility]) 09/15/2020    Personal history of noncompliance with medical treatment, presenting hazards to health 2020    Scrotal edema 2019    Bilateral leg edema 10/09/2019    Major depressive disorder, recurrent episode, moderate ([address] 2019    Colostomy status ([address] 2019    Generalized muscle weakness 2019    Other chronic pain 2019    Morbid obesity ([address]     Diabetes ([address]     Essential hypertension     Primary insomnia     Nursing home resident 2018    Morbid obesity due to excess calories ([address] 11/08/2018    Stage IV pressure ulcer of sacral region ([facility] 11/08/2018     REVIEW OF SYSTEMS  Positive responses are highlighted in bold  Constitutional:  Fever, Chills, Night Sweats, Fatigue, Unexpected changes in weight, Generalized Pain to coccyx wound   HENT:  Ear pain, Tinnitus, Nosebleeds, Trouble swallowing, Hearing loss, Sore throat  Cardiovascular:  Chest Pain, Palpitations, Orthopnea, Paroxysmal Nocturnal Dyspnea  Respiratory:  Cough, Wheezing, Shortness of breath, Chest tightness, Apnea  Gastrointestinal:  Nausea, Vomiting, Diarrhea, Constipation, Heartburn, Blood in stool  Genitourinary:  Difficulty or painful urination, Flank pain, Change in frequency, Urgency  Skin:  Color change, Rash, Itching, Wound  Musculoskeletal:  Joint pain, Back pain, Gait problems, Joint swelling, Myalgias  Neurological:  Dizziness, Headaches, Presyncope, Numbness, Seizures, Tremors  Endocrine:  Heat Intolerance, Cold Intolerance, Polydipsia, Polyphagia, Polyuria    OBJECTIVE:  PHYSICAL EXAM:  VS:  111/65 97.0, 74, 17, 242 blood glucose, 98% on room air  Weight in pounds:  337.7 (was 334.4, 338.4, 347.3, 429.9, 460.9, 473.0)  Pain: Generalized pain is controlled with current pain medication. VS reviewed. General Appearance: morbidly obese, debilitated.   Head: normocephalic and atraumatic  Eyes: conjunctivae and eye lids without erythema  ENT: external ear and ear canal normal bilaterally, nose without deformity, nasal mucosa and turbinates normal without polyps, oropharynx normal, dentition is normal for age, no lip or gum lesions noted  Neck: supple and non-tender without mass, no thyromegaly or thyroid nodules, no cervical lymphadenopathy  Pulmonary/Chest: Lungs clear but diminished to auscultation bilaterally- no wheezes, rales or rhonchi, normal air movement, no respiratory distress or retractions, requires chronic use of supplemental oxygen  Cardiovascular: normal rate, regular rhythm, normal S1 and S2, no murmurs, rubs, clicks, or gallops, distal pulses intact. Right chest dialysis catheter is in place. Abdomen: obese abdomen is soft, non-tender, non-distended, bowel sounds physiologic,  no rebound or guarding, no masses or hernias noted. Liver and spleen without enlargement. Colostomy in LUQ drains formed stool. Genitourinary:  Makes little or no urine. Extremities: no cyanosis, clubbing. Chronic, generalized edema to BLE is resolved. Musculoskeletal: No joint swelling or gross deformity. He prefers to be in bed most of the time but has been getting up to his motorized wheelchair occasionally. Neuro:  Alert and oriented, 4/5 strength to BUE. BLE with 0/5 strength, normal speech, no focal findings or movement disorder noted  Psych:  Normal affect without evidence of depression or anxiety, insight and judgement are intact, memory appears intact. He is routinely pleasant and communicates well with staff. Skin: warm and dry, no rash or erythema;    LABS    11/2/21:  A1C 7.0/154.  11/1/21:  Na 137, K 4.6, chl 99, CO2 27, BUN 44, Creat 2.5, GFR 29.  11/1/21:  Calcium 9.0, total protein 6.9, albumin 3.1, alk phos 90, ast 9, alt 14, total bili 0.3.  11/1/21:  Cholesterol 107, triglycerides 199, hld 26, ldl 41.  11/1/21:  WBC 9.3, hgb 9.7, platelets 350.  4/2/95:  A1C 7.2/160.  6/1/21:  Wound positive for A. BAUMANNII, E. COLI, S. AGALACT-GP BP  6/1/21:  WBC 9.1, Hgb 9.3, platelets 998.  2/9/48:  Na 135, K 5.1, chl 97, CO2 31, BUN 28, Creat 2.2, GFR 33, calcium 9.2.  3/1/21:  Cholesterol 96, triglyc 160, HDL 25, LDL 39,   3/1/21:  Vitamin D 34. ASSESSMENT & PLAN:    1. Acute kidney failure  Continue hemodialysis M-W-F. Current with Dr. Guanaco Ya. Ativan was stopped and hydroxyzine 25 mg was initiated prior to hemodialysis. He states hydralazine is effective for anxiety. He acknowledges that Dialysis is going \"better than he thought it would\". Appreciate staff encouragement and support.     2.  Urinary retention  OK to maintain indwelling soto catheter for urinary retention due to edema. 3. Decubitus ulcer of sacral region, stage 4 (HCC)  Chronic. Continue wound care per Dr. Nettie Deleon. In-house Wound Nurse is on board and reports his wound is worse, perhaps from being up in his chair for prolonged periods of time visiting his mother during Hospice and for her .  He reports feeling well and denies fevers and chills. Continue MVI and Zinc supplements. 4.  COPD  Stable at time of assessment. Continue prn DuoNebs and supplemental oxygen as needed. 5. Essential hypertension  SBP is routinely less than 120 and HR is routinely less than 80. Has done well with decreased dosage of Metoprolol 50 mg PO bid. Lisinopril was stopped for high potassium levels - has done well off Lisinopril. He denies chest pain, pressure, heaviness. No SOB. Monitor closely. 6.  Chronic pain  Pt reports chronic pain when sitting up to a chair and when in dialysis for prolong periods of time. Continue Oxycodone 5-10 mg po every 6 hours prn. OK for patient to have an additional dose of Oxycodone 5 mg po prior to getting up to a chair. OK for patient to have an additional dose of Oxycodone 5 mg po prior to dialysis. Continue to monitor pain. Patient's chronic pain is from the chronic wound to his coccyx. He updates me that Dr. Nettie Deleon saw him last week or so and reports that he believe's Brooks's wound is healing. 7. Other specified diabetes mellitus with other specified complication, with long-term current use of insulin ([facility])  Metformin and Januvia were stopped due to poor kidney function. Continue sliding scale Humalog, Levemir as ordered. Staff tell me that his appetite is increased and he is less compliant with diabetic diet. A1C per routine. No episodes of hypoglycemia. Continue to monitor. 8. Primary insomnia  Continue home dose of Ambien. He has failed Trazodone in the past.  Denies problems sleeping at night.     9. Bilateral lower leg edema  Bilateral Edema is resolved. Routine weights with Dialysis are improving. 10.  Major depressive disorder, recurrent. No SI/HI. He smiles and makes eye contact and engages in conversation. He is appreciative and reports dialysis is going well. He has declined consultation to Psych and Counseling services several times in the past.  He is encouraged to be oob in his motorized wheelchair as tolerates. Continue current dosage of Zoloft 100 mg po once daily. He communicates well with staff. Antipsychotic/Antianxiety/Hypnotic/Psychotropic/Sedation/Antidepressant medications are continued at this time because discontinuation may result in adverse effects of return or concerning behaviors/symptoms. 11.  Hyperlipidemia  ASA, statin. Routine lab monitoring. 12.  Hypocalcemia  Continue supplemental calcium and vitamin D. Routine lab monitoring. 13.  Attention to Colostomy  Diverting colostomy continues to drain formed stool. Will increase dosage of colace to 200 mg daily. Staff report no concerns. 14. Morbid obesity due to excess calories (HCC)  Appetite good. Noncompliant with diabetic diet. Encourage patient to be oob in wheelchair on a daily basis. 15.  Right lower leg wound  ACE wrap is intact at time of assessment. He is compliant with wearing padded protective boots. Continue treatment per Dr. Sung Lowery, Wound Physician, and in-house Wound Nurse. Disposition:  Assessment and plan as stated above. Follow-up per routine and as warranted.     Plan of care reviewed by Dr. Georgette Lema MD.  Electronically signed by GAYATHRI Myles NP on 12/14/2021 at 4:52 PM

## 2022-08-30 PROBLEM — R29.898 BILATERAL LEG WEAKNESS: Status: ACTIVE | Noted: 2022-08-30

## 2022-11-24 PROBLEM — E83.51 HYPOCALCEMIA: Status: ACTIVE | Noted: 2022-11-24

## 2022-11-24 PROBLEM — E78.2 MIXED HYPERLIPIDEMIA: Status: ACTIVE | Noted: 2022-11-24

## 2022-11-24 PROBLEM — I95.3 INTRA-DIALYTIC HYPOTENSION: Status: ACTIVE | Noted: 2022-11-24

## 2023-10-24 PROBLEM — Z87.19 HISTORY OF GI BLEED: Status: ACTIVE | Noted: 2023-10-24

## 2023-10-24 PROBLEM — U07.1 COVID-19: Status: RESOLVED | Noted: 2020-11-16 | Resolved: 2023-10-24

## 2023-10-24 PROBLEM — J96.21 ACUTE ON CHRONIC HYPOXIC RESPIRATORY FAILURE (HCC): Status: ACTIVE | Noted: 2023-10-24

## 2023-10-24 PROBLEM — D63.1 ANEMIA DUE TO END STAGE RENAL DISEASE (HCC): Status: ACTIVE | Noted: 2020-09-15

## 2023-11-16 ENCOUNTER — NURSE ONLY (OUTPATIENT)
Dept: LAB | Age: 43
End: 2023-11-16

## 2023-11-16 LAB
ANION GAP SERPL CALC-SCNC: 8 MEQ/L (ref 8–16)
ANISOCYTOSIS BLD QL SMEAR: PRESENT
BASOPHILS ABSOLUTE: 0 THOU/MM3 (ref 0–0.1)
BASOPHILS NFR BLD AUTO: 0.7 %
BUN SERPL-MCNC: 36 MG/DL (ref 7–22)
CALCIUM SERPL-MCNC: 8.4 MG/DL (ref 8.5–10.5)
CHLORIDE SERPL-SCNC: 100 MEQ/L (ref 98–111)
CO2 SERPL-SCNC: 32 MEQ/L (ref 23–33)
CREAT SERPL-MCNC: 3.9 MG/DL (ref 0.4–1.2)
DEPRECATED RDW RBC AUTO: 66.3 FL (ref 35–45)
EOSINOPHIL NFR BLD AUTO: 6 %
EOSINOPHILS ABSOLUTE: 0.4 THOU/MM3 (ref 0–0.4)
ERYTHROCYTE [DISTWIDTH] IN BLOOD BY AUTOMATED COUNT: 17.3 % (ref 11.5–14.5)
GFR SERPL CREATININE-BSD FRML MDRD: 19 ML/MIN/1.73M2
GLUCOSE SERPL-MCNC: 89 MG/DL (ref 70–108)
HCT VFR BLD AUTO: 38.1 % (ref 42–52)
HGB BLD-MCNC: 10.6 GM/DL (ref 14–18)
HYPOCHROMIA BLD QL SMEAR: PRESENT
IMM GRANULOCYTES # BLD AUTO: 0.01 THOU/MM3 (ref 0–0.07)
IMM GRANULOCYTES NFR BLD AUTO: 0.2 %
LYMPHOCYTES ABSOLUTE: 0.7 THOU/MM3 (ref 1–4.8)
LYMPHOCYTES NFR BLD AUTO: 10.6 %
MCH RBC QN AUTO: 29 PG (ref 26–33)
MCHC RBC AUTO-ENTMCNC: 27.8 GM/DL (ref 32.2–35.5)
MCV RBC AUTO: 104.1 FL (ref 80–94)
MONOCYTES ABSOLUTE: 0.5 THOU/MM3 (ref 0.4–1.3)
MONOCYTES NFR BLD AUTO: 7.3 %
NEUTROPHILS NFR BLD AUTO: 75.2 %
NRBC BLD AUTO-RTO: 0 /100 WBC
PLATELET # BLD AUTO: 181 THOU/MM3 (ref 130–400)
PMV BLD AUTO: 10.6 FL (ref 9.4–12.4)
POIKILOCYTES: ABNORMAL
POTASSIUM SERPL-SCNC: 4.3 MEQ/L (ref 3.5–5.2)
RBC # BLD AUTO: 3.66 MILL/MM3 (ref 4.7–6.1)
SCAN OF BLOOD SMEAR: NORMAL
SEGMENTED NEUTROPHILS ABSOLUTE COUNT: 4.7 THOU/MM3 (ref 1.8–7.7)
SODIUM SERPL-SCNC: 140 MEQ/L (ref 135–145)
WBC # BLD AUTO: 6.2 THOU/MM3 (ref 4.8–10.8)

## 2024-01-31 PROBLEM — Z99.2 TYPE 2 DIABETES MELLITUS WITH CHRONIC KIDNEY DISEASE ON CHRONIC DIALYSIS, WITH LONG-TERM CURRENT USE OF INSULIN (HCC): Status: ACTIVE | Noted: 2024-01-31

## 2024-01-31 PROBLEM — Z79.4 TYPE 2 DIABETES MELLITUS WITH CHRONIC KIDNEY DISEASE ON CHRONIC DIALYSIS, WITH LONG-TERM CURRENT USE OF INSULIN (HCC): Status: ACTIVE | Noted: 2024-01-31

## 2024-01-31 PROBLEM — N18.6 TYPE 2 DIABETES MELLITUS WITH CHRONIC KIDNEY DISEASE ON CHRONIC DIALYSIS, WITH LONG-TERM CURRENT USE OF INSULIN (HCC): Status: ACTIVE | Noted: 2024-01-31

## 2024-01-31 PROBLEM — E11.22 TYPE 2 DIABETES MELLITUS WITH CHRONIC KIDNEY DISEASE ON CHRONIC DIALYSIS, WITH LONG-TERM CURRENT USE OF INSULIN (HCC): Status: ACTIVE | Noted: 2024-01-31

## 2024-10-17 ENCOUNTER — CLINICAL DOCUMENTATION (OUTPATIENT)
Dept: PALLATIVE CARE | Age: 44
End: 2024-10-17

## 2024-10-17 NOTE — ACP (ADVANCE CARE PLANNING)
Advance Care Planning       Advanced Steps Advance Care Planning Conversation  (Goals of Care for Serious Illness and/or Frailty)        Date of conversation: 10/17/24  Location: [facility] [facility], [place], [place]  Length (minutes): 42    Advanced Steps® ACP Facilitator: Cortney Galicia, RN, BSN    Participants:    [x] Patient     Healthcare Decision Maker:      Primary Decision Maker: Lorene Landry - Parent - [phone]    Secondary Decision Maker: Clement Landry - Brother/Sister - [phone]     Offered to complete HCPOA this date. Pt declined due to wanting to talk to his dad first and see if he still wishes to be the primary decision maker. There are no Advance Directives currently. Pt has no children or spouse. His father is [place]'s legal next of kin and his brother would follow.       Conversation Topics    Referral completed due to code status confusion. Pt had elected \"DNI\" at his hospital of choice. His DNR-CC is not valid, as someone wrote \"CPR OK\" and \"no intubation.\"  Facility order read, \"Full code. In emergency, do not begin CPR, call 011, no chest compressions, may use ambu bag for rescue breaths and defibrillation when available if needed.\"    Much discussion revolved around in-hospital choices verses [place] law, the steps of CPR and what the different levels entail including the nuances and of the [place] DNR levels. Pt stated he wants to return to the \"normal\" DNR and understands that DNR-CCA will allow him to receive all treatment including ventilation if needed, but he will not receive CPR upon his death.      Understanding of Medical Condition/s AND Potential Complications:    Patient response: They are very honest with me and let me know how my labs look at dialysis. Pt understands ESRD is a chronic/progressive illness. He described his history with joint pain and neuropathy and understands how his diabetes, pain and use of pain meds (necessary) contributed to his kidney function and permanent

## 2025-05-30 NOTE — PROGRESS NOTES
Stephie Benz is a 36 y.o. male that is being seen at [address] for Other (ESRD)      Jessica Landry  1980  10/28/20      HPI:  Patient seen and examined at bedside. History obtain from patient and chart. States that he is doing well for the most part. He has been compliant with HD recently and has been losing quite a bit of weight. Pain is about the same. Appetite is doing ok. Energy is increased. I have reviewed the patient's past medical history, past surgical history, allergies,medications, social and family history and I have made updates where appropriate. Past Medical History:   Diagnosis Date    Diabetes ([address]     HTN (hypertension)     Insomnia     Morbid obesity (HCC)     Pressure ulcer        No past surgical history on file. Social History     Tobacco Use    Smoking status: Not on file   Substance Use Topics    Alcohol use: Not on file    Drug use: Not on file       No family history on file. Review of Systems   Constitutional: Negative for chills and fever. HENT: Negative for hearing loss and sore throat. Respiratory: Negative for cough, shortness of breath and wheezing. Cardiovascular: Negative for chest pain and palpitations. Gastrointestinal: Negative for abdominal pain, constipation, diarrhea, nausea and vomiting. Genitourinary: Negative for dysuria and hematuria. Musculoskeletal: Negative for back pain. Skin: Positive for wound. Neurological: Negative for headaches. PHYSICAL EXAM:    /78   Pulse 88   Temp 98.8 °F (37.1 °C)   Resp 18   Wt (!) 460 lb (208.7 kg)       Physical Exam  Vitals signs and nursing note reviewed. Constitutional:       General: He is not in acute distress. Appearance: He is well-developed. HENT:      Head: Normocephalic and atraumatic.       Right Ear: Hearing and external ear normal.      Left Ear: Hearing and external ear normal.      Nose: Nose normal.   Eyes:      General:         Right eye: No discharge. Left eye: No discharge. Conjunctiva/sclera: Conjunctivae normal.   Neck:      Musculoskeletal: Normal range of motion and neck supple. Thyroid: No thyromegaly. Trachea: No tracheal deviation. Cardiovascular:      Rate and Rhythm: Normal rate and regular rhythm. Heart sounds: Normal heart sounds. No murmur. No friction rub. No gallop. Pulmonary:      Effort: Pulmonary effort is normal. No respiratory distress. Breath sounds: No wheezing or rales. Chest:      Chest wall: No tenderness. Musculoskeletal:         General: Swelling (severe lymphedema of the LEs bilaterally) present. No deformity. Lymphadenopathy:      Cervical: No cervical adenopathy. Skin:     General: Skin is warm and dry. Findings: No erythema. Neurological:      Mental Status: He is alert. Motor: No abnormal muscle tone. Coordination: Coordination normal.   Psychiatric:         Behavior: Behavior normal.         Thought Content: Thought content normal.         Judgment: Judgment normal.         ASSESSMENT & PLAN  Dano Michelle was seen today for other. Diagnoses and all orders for this visit:    Other specified diabetes mellitus with other specified complication, without long-term current use of insulin ([address]    Essential hypertension    Primary insomnia    Morbid obesity due to excess calories ([address]    Stage IV pressure ulcer of sacral region [facility])    Acute renal failure, unspecified acute renal failure type [facility])    Dialysis patient [facility])    Personal history of noncompliance with medical treatment, presenting hazards to health            1. Decubitus Ulcer:  cont wound care, Oxycodone  2. DM2:  cont Levemir, Metformin, SS insulin, Linagliptin  3. HTN:  cont Metoprolol,   4.  Insomnia:  cont [place] [facility] none